# Patient Record
Sex: FEMALE | Race: BLACK OR AFRICAN AMERICAN | NOT HISPANIC OR LATINO | Employment: OTHER | URBAN - METROPOLITAN AREA
[De-identification: names, ages, dates, MRNs, and addresses within clinical notes are randomized per-mention and may not be internally consistent; named-entity substitution may affect disease eponyms.]

---

## 2020-12-07 LAB
HPV16+18+H RISK 12 DNA CVX-IMP: NEGATIVE
PAP RECOMMENDATION EXT: NORMAL
PAP SMEAR: NORMAL

## 2021-07-06 ENCOUNTER — HISTORICAL (OUTPATIENT)
Dept: ADMINISTRATIVE | Facility: HOSPITAL | Age: 41
End: 2021-07-06

## 2021-07-06 LAB — SARS-COV-2 RNA RESP QL NAA+PROBE: NEGATIVE

## 2022-04-11 ENCOUNTER — HISTORICAL (OUTPATIENT)
Dept: ADMINISTRATIVE | Facility: HOSPITAL | Age: 42
End: 2022-04-11

## 2022-04-26 VITALS
OXYGEN SATURATION: 97 % | BODY MASS INDEX: 36.92 KG/M2 | HEIGHT: 62 IN | WEIGHT: 200.63 LBS | DIASTOLIC BLOOD PRESSURE: 88 MMHG | SYSTOLIC BLOOD PRESSURE: 120 MMHG

## 2022-10-20 ENCOUNTER — HOSPITAL ENCOUNTER (EMERGENCY)
Facility: HOSPITAL | Age: 42
Discharge: HOME | End: 2022-10-20
Attending: STUDENT IN AN ORGANIZED HEALTH CARE EDUCATION/TRAINING PROGRAM

## 2022-10-20 VITALS
RESPIRATION RATE: 18 BRPM | TEMPERATURE: 98 F | OXYGEN SATURATION: 100 % | HEIGHT: 62 IN | SYSTOLIC BLOOD PRESSURE: 151 MMHG | WEIGHT: 191 LBS | HEART RATE: 81 BPM | BODY MASS INDEX: 35.15 KG/M2 | DIASTOLIC BLOOD PRESSURE: 97 MMHG

## 2022-10-20 DIAGNOSIS — B37.9 YEAST INFECTION: ICD-10-CM

## 2022-10-20 DIAGNOSIS — R10.9 ABDOMINAL PAIN, UNSPECIFIED ABDOMINAL LOCATION: ICD-10-CM

## 2022-10-20 DIAGNOSIS — N39.0 URINARY TRACT INFECTION WITHOUT HEMATURIA, SITE UNSPECIFIED: Primary | ICD-10-CM

## 2022-10-20 DIAGNOSIS — A59.9 TRICHOMONIASIS: ICD-10-CM

## 2022-10-20 DIAGNOSIS — R30.0 DYSURIA: ICD-10-CM

## 2022-10-20 LAB
ALBUMIN SERPL-MCNC: 4 GM/DL (ref 3.5–5)
ALBUMIN/GLOB SERPL: 1.2 RATIO (ref 1.1–2)
ALP SERPL-CCNC: 85 UNIT/L (ref 40–150)
ALT SERPL-CCNC: 14 UNIT/L (ref 0–55)
APPEARANCE UR: ABNORMAL
AST SERPL-CCNC: 18 UNIT/L (ref 5–34)
BACTERIA #/AREA URNS AUTO: ABNORMAL /HPF
BASOPHILS # BLD AUTO: 0.03 X10(3)/MCL (ref 0–0.2)
BASOPHILS NFR BLD AUTO: 0.5 %
BILIRUB UR QL STRIP.AUTO: NEGATIVE MG/DL
BILIRUBIN DIRECT+TOT PNL SERPL-MCNC: 0.4 MG/DL
BUN SERPL-MCNC: 6.4 MG/DL (ref 7–18.7)
C TRACH DNA SPEC QL NAA+PROBE: NOT DETECTED
CALCIUM SERPL-MCNC: 9.2 MG/DL (ref 8.4–10.2)
CHLORIDE SERPL-SCNC: 105 MMOL/L (ref 98–107)
CO2 SERPL-SCNC: 22 MMOL/L (ref 22–29)
COLOR UR AUTO: YELLOW
CREAT SERPL-MCNC: 0.85 MG/DL (ref 0.55–1.02)
EOSINOPHIL # BLD AUTO: 0.13 X10(3)/MCL (ref 0–0.9)
EOSINOPHIL NFR BLD AUTO: 2 %
ERYTHROCYTE [DISTWIDTH] IN BLOOD BY AUTOMATED COUNT: 14 % (ref 11.5–17)
GFR SERPLBLD CREATININE-BSD FMLA CKD-EPI: >60 MLS/MIN/1.73/M2
GLOBULIN SER-MCNC: 3.4 GM/DL (ref 2.4–3.5)
GLUCOSE SERPL-MCNC: 121 MG/DL (ref 74–100)
GLUCOSE UR QL STRIP.AUTO: NEGATIVE MG/DL
HCT VFR BLD AUTO: 35.8 % (ref 37–47)
HGB BLD-MCNC: 11.5 GM/DL (ref 12–16)
IMM GRANULOCYTES # BLD AUTO: 0.02 X10(3)/MCL (ref 0–0.04)
IMM GRANULOCYTES NFR BLD AUTO: 0.3 %
KETONES UR QL STRIP.AUTO: NEGATIVE MG/DL
LEUKOCYTE ESTERASE UR QL STRIP.AUTO: ABNORMAL UNIT/L
LYMPHOCYTES # BLD AUTO: 1.88 X10(3)/MCL (ref 0.6–4.6)
LYMPHOCYTES NFR BLD AUTO: 28.7 %
MCH RBC QN AUTO: 29.9 PG (ref 27–31)
MCHC RBC AUTO-ENTMCNC: 32.1 MG/DL (ref 33–36)
MCV RBC AUTO: 93.2 FL (ref 80–94)
MONOCYTES # BLD AUTO: 0.28 X10(3)/MCL (ref 0.1–1.3)
MONOCYTES NFR BLD AUTO: 4.3 %
MUCOUS THREADS URNS QL MICRO: ABNORMAL /LPF
N GONORRHOEA DNA SPEC QL NAA+PROBE: NOT DETECTED
NEUTROPHILS # BLD AUTO: 4.2 X10(3)/MCL (ref 2.1–9.2)
NEUTROPHILS NFR BLD AUTO: 64.2 %
NITRITE UR QL STRIP.AUTO: NEGATIVE
NRBC BLD AUTO-RTO: 0 %
PH UR STRIP.AUTO: 5.5 [PH]
PLATELET # BLD AUTO: 410 X10(3)/MCL (ref 130–400)
PMV BLD AUTO: 8.9 FL (ref 7.4–10.4)
POTASSIUM SERPL-SCNC: 4 MMOL/L (ref 3.5–5.1)
PROT SERPL-MCNC: 7.4 GM/DL (ref 6.4–8.3)
PROT UR QL STRIP.AUTO: NEGATIVE MG/DL
RBC # BLD AUTO: 3.84 X10(6)/MCL (ref 4.2–5.4)
RBC #/AREA URNS AUTO: ABNORMAL /HPF
RBC UR QL AUTO: ABNORMAL UNIT/L
SODIUM SERPL-SCNC: 136 MMOL/L (ref 136–145)
SP GR UR STRIP.AUTO: 1.02 (ref 1–1.03)
SQUAMOUS #/AREA URNS AUTO: <5 /HPF
T VAGINALIS URNS QL MICRO: ABNORMAL /HPF
UROBILINOGEN UR STRIP-ACNC: 0.2 MG/DL
WBC # SPEC AUTO: 6.6 X10(3)/MCL (ref 4.5–11.5)
WBC #/AREA URNS AUTO: 113 /HPF
YEAST URNS QL MICRO: ABNORMAL /HPF

## 2022-10-20 PROCEDURE — 96372 THER/PROPH/DIAG INJ SC/IM: CPT | Performed by: STUDENT IN AN ORGANIZED HEALTH CARE EDUCATION/TRAINING PROGRAM

## 2022-10-20 PROCEDURE — 81001 URINALYSIS AUTO W/SCOPE: CPT | Performed by: STUDENT IN AN ORGANIZED HEALTH CARE EDUCATION/TRAINING PROGRAM

## 2022-10-20 PROCEDURE — 99284 EMERGENCY DEPT VISIT MOD MDM: CPT

## 2022-10-20 PROCEDURE — 87591 N.GONORRHOEAE DNA AMP PROB: CPT | Performed by: STUDENT IN AN ORGANIZED HEALTH CARE EDUCATION/TRAINING PROGRAM

## 2022-10-20 PROCEDURE — 63600175 PHARM REV CODE 636 W HCPCS: Performed by: STUDENT IN AN ORGANIZED HEALTH CARE EDUCATION/TRAINING PROGRAM

## 2022-10-20 PROCEDURE — 36415 COLL VENOUS BLD VENIPUNCTURE: CPT | Performed by: STUDENT IN AN ORGANIZED HEALTH CARE EDUCATION/TRAINING PROGRAM

## 2022-10-20 PROCEDURE — 80053 COMPREHEN METABOLIC PANEL: CPT | Performed by: STUDENT IN AN ORGANIZED HEALTH CARE EDUCATION/TRAINING PROGRAM

## 2022-10-20 PROCEDURE — 87491 CHLMYD TRACH DNA AMP PROBE: CPT | Performed by: STUDENT IN AN ORGANIZED HEALTH CARE EDUCATION/TRAINING PROGRAM

## 2022-10-20 PROCEDURE — 87088 URINE BACTERIA CULTURE: CPT | Performed by: STUDENT IN AN ORGANIZED HEALTH CARE EDUCATION/TRAINING PROGRAM

## 2022-10-20 PROCEDURE — 85025 COMPLETE CBC W/AUTO DIFF WBC: CPT | Performed by: STUDENT IN AN ORGANIZED HEALTH CARE EDUCATION/TRAINING PROGRAM

## 2022-10-20 RX ORDER — CEFDINIR 300 MG/1
300 CAPSULE ORAL 2 TIMES DAILY
Qty: 14 CAPSULE | Refills: 0 | Status: SHIPPED | OUTPATIENT
Start: 2022-10-20 | End: 2022-10-27

## 2022-10-20 RX ORDER — WATER FOR INJECTION,STERILE
VIAL (ML) INJECTION
Status: DISCONTINUED
Start: 2022-10-20 | End: 2022-10-20 | Stop reason: HOSPADM

## 2022-10-20 RX ORDER — METRONIDAZOLE 500 MG/1
500 TABLET ORAL EVERY 12 HOURS
Qty: 14 TABLET | Refills: 0 | Status: SHIPPED | OUTPATIENT
Start: 2022-10-20 | End: 2022-10-27

## 2022-10-20 RX ORDER — FLUCONAZOLE 150 MG/1
150 TABLET ORAL DAILY
Qty: 1 TABLET | Refills: 0 | Status: SHIPPED | OUTPATIENT
Start: 2022-10-20 | End: 2022-10-21

## 2022-10-20 RX ORDER — CEFTRIAXONE 1 G/1
0.5 INJECTION, POWDER, FOR SOLUTION INTRAMUSCULAR; INTRAVENOUS
Status: COMPLETED | OUTPATIENT
Start: 2022-10-20 | End: 2022-10-20

## 2022-10-20 RX ADMIN — CEFTRIAXONE SODIUM 0.5 G: 1 INJECTION, POWDER, FOR SOLUTION INTRAMUSCULAR; INTRAVENOUS at 10:10

## 2022-10-20 NOTE — ED PROVIDER NOTES
Encounter Date: 10/20/2022    SCRIBE #1 NOTE: I, Renata Segundo, am scribing for, and in the presence of,  Sanchez Peters IV, MD. I have scribed the following portions of the note - Other sections scribed: HPI, ROS, PE.     History     Chief Complaint   Patient presents with    Abdominal Pain     Pt complains of lower abd and left flank pain. Onset x this morning. Pt states that she is was being treated for UTI but stopped medications due to yeast infection. States that she has dyuria. Denies n/v.      43 y/o female presents to the ED with complaints of L flank pain that radiates to the front. Pt reports that she has been having bladder spasms and intermittent dysuria, and now cloudy urine. She notes hat she was put on Bactrim but stopped taking it because she has a yeast infection now. She also complains of nausea and denies vomiting, fevers, and chills. She notes that she has had UTIs before but this is a lot worse.     The history is provided by the patient. No  was used.   Abdominal Pain  The current episode started several days ago. The onset of the illness was gradual. The problem has been gradually worsening. The abdominal pain is located in the left flank. The abdominal pain radiates to the LUQ. The abdominal pain is relieved by nothing. The other symptoms of the illness include nausea and dysuria. The other symptoms of the illness do not include fever, shortness of breath, vomiting, vaginal discharge or vaginal bleeding.   The dysuria is not associated with hematuria.   Symptoms associated with the illness do not include chills or hematuria.   Review of patient's allergies indicates:  No Known Allergies  History reviewed. No pertinent past medical history.  History reviewed. No pertinent surgical history.  History reviewed. No pertinent family history.  Social History     Tobacco Use    Smoking status: Never    Smokeless tobacco: Never     Review of Systems   Constitutional:   Negative for chills and fever.   HENT:  Negative for congestion, rhinorrhea and sore throat.    Eyes:  Negative for visual disturbance.   Respiratory:  Negative for cough and shortness of breath.    Cardiovascular:  Negative for chest pain and leg swelling.   Gastrointestinal:  Positive for abdominal pain and nausea. Negative for vomiting.   Genitourinary:  Positive for dysuria. Negative for hematuria, vaginal bleeding and vaginal discharge.   Musculoskeletal:  Negative for joint swelling.   Skin:  Negative for rash.   Neurological:  Negative for weakness.   Psychiatric/Behavioral:  Negative for confusion.      Physical Exam     Initial Vitals [10/20/22 0754]   BP Pulse Resp Temp SpO2   (!) 168/107 108 18 97.5 °F (36.4 °C) 100 %      MAP       --         Physical Exam    Nursing note and vitals reviewed.  Constitutional: She is not diaphoretic. No distress.   HENT:   Head: Normocephalic and atraumatic.   Eyes: EOM are normal. Pupils are equal, round, and reactive to light.   Neck: Neck supple.   Normal range of motion.  Cardiovascular:  Normal rate and regular rhythm.           No murmur heard.  Pulmonary/Chest: Breath sounds normal. No respiratory distress. She has no wheezes. She has no rales.   Abdominal: Abdomen is soft. She exhibits no distension. There is no abdominal tenderness.   Musculoskeletal:         General: Normal range of motion.      Cervical back: Normal range of motion and neck supple.     Neurological: She is alert and oriented to person, place, and time. She has normal strength.   Skin: Skin is warm. No rash noted.   Psychiatric: She has a normal mood and affect.       ED Course   Procedures  Labs Reviewed   COMPREHENSIVE METABOLIC PANEL - Abnormal; Notable for the following components:       Result Value    Glucose Level 121 (*)     Blood Urea Nitrogen 6.4 (*)     All other components within normal limits   URINALYSIS, REFLEX TO URINE CULTURE - Abnormal; Notable for the following components:     Appearance, UA Cloudy (*)     Blood, UA 3+ (*)     Leukocyte Esterase, UA 3+ (*)     All other components within normal limits   CBC WITH DIFFERENTIAL - Abnormal; Notable for the following components:    RBC 3.84 (*)     Hgb 11.5 (*)     Hct 35.8 (*)     MCHC 32.1 (*)     Platelet 410 (*)     All other components within normal limits   URINALYSIS, MICROSCOPIC - Abnormal; Notable for the following components:    RBC, UA 21-50 (*)     WBC,  (*)     Bacteria, UA 1+ (*)     Mucous, UA Small (*)     Yeast, UA Rare (*)     Trichomonas, UA Rare (*)     All other components within normal limits   CHLAMYDIA/GONORRHOEAE(GC), PCR - Normal    Narrative:     The Xpert CT/NG test, performed on the Axonifypert system is a qualitative in vitro real-time polymerase chain reaction (PCR) test for the automated detected and differentiation for genomic DNA from Chlamydia trachomatis (CT) and/or Neisseria gonorrhoeae (NG).   CULTURE, URINE   CBC W/ AUTO DIFFERENTIAL    Narrative:     The following orders were created for panel order CBC auto differential.  Procedure                               Abnormality         Status                     ---------                               -----------         ------                     CBC with Differential[[id number]]        Abnormal            Final result                 Please view results for these tests on the individual orders.          Imaging Results    None          Medications   cefTRIAXone injection 0.5 g (0.5 g Intramuscular Given 10/20/22 1010)     Medical Decision Making:   History:   Old Medical Records: I decided to obtain old medical records.  Differential Diagnosis:   Uti, sti, pyelo, candidiasis   Clinical Tests:   Lab Tests: Ordered and Reviewed  ED Management:  IM rocephin           Attending Attestation:           Physician Attestation for Scribe:  Physician Attestation Statement for Scribe #1: I, Sanchez Peters IV, MD, reviewed documentation, as scribed by Renata  Sanya in my presence, and it is both accurate and complete.           ED Course as of 10/20/22 1853   Thu Oct 20, 2022   0937 UA with yeast, trich, and UTI. Patient reports possible STI exposure. Will add on GC/chlamydia. Offered pelvic exam, she would prefer empiric treatment. Will give IM rocephin now and call in doxy if GC/chlamydia positive.  [AC]      ED Course User Index  [AC] Sanchez Peters IV, MD                 Clinical Impression:   Final diagnoses:  [N39.0] Urinary tract infection without hematuria, site unspecified (Primary)  [B37.9] Yeast infection  [A59.9] Trichomoniasis  [R10.9] Abdominal pain, unspecified abdominal location  [R30.0] Dysuria      ED Disposition Condition    Discharge Stable          ED Prescriptions       Medication Sig Dispense Start Date End Date Auth. Provider    fluconazole (DIFLUCAN) 150 MG Tab Take 1 tablet (150 mg total) by mouth once daily. for 1 day 1 tablet 10/20/2022 10/21/2022 Sanchez Petres IV, MD    cefdinir (OMNICEF) 300 MG capsule Take 1 capsule (300 mg total) by mouth 2 (two) times daily. for 7 days 14 capsule 10/20/2022 10/27/2022 Sanchez Peters IV, MD    metroNIDAZOLE (FLAGYL) 500 MG tablet Take 1 tablet (500 mg total) by mouth every 12 (twelve) hours. for 7 days 14 tablet 10/20/2022 10/27/2022 Sanchez Peters IV, MD          Follow-up Information       Follow up With Specialties Details Why Contact Info    [facility] - Emergency Dept Emergency Medicine Go to  If symptoms worsen [address] [facility] [id number] [phone]    PCP  Schedule an appointment as soon as possible for a visit           I, Sanchez Peters MD personally performed the history, PE, MDM, and procedures as documented above and agree with the scribe's documentation.        Sanchez Peters IV, MD  10/20/22 7371

## 2022-10-20 NOTE — DISCHARGE INSTRUCTIONS
Do not drink alcohol while taking antibiotics     Follow up with your primary care physician in 2-3 days.      Return to the emergency department if any worsening symptoms, fever, chest pain, difficulty breathing, or any other new symptoms or concerns.      Please signup for MyChart as noted below in your paperwork to review all labwork, imaging results, and any other incidental findings from today's visit.       Advice after your visit:  Your visit in the emergency department is NOT definitive care - please follow-up with your primary care doctor and/or specialist within 1-2 days.  Please return if you have any worsening in your condition or if you have any other concerns.    If you had radiology exams like an XRAY or CT in the emergency Department the interpreation on them may be preliminary - there may be less time sensitive findings on the reports please obtain these reports within 24 hours from the hospital or by using your out on your mobile phone to access records.  Bring these to your primary care doctor and/or specialist for further review of incidental findings.    Please review any LAB WORK from your visit today with your primary care physician.    If you were prescribed OPIATE PAIN MEDICATION - please understand of these medications can be addictive, you may fill less of the prescription was written for, you do not have to take the full prescription.  You may discard what you do not use.  Please seek help if you feel you are having problems with addiction.  Do not drive or operate heavy machinery if you are taking sedating medications.  Do not mix these medications with alcohol.      If you had a SPLINT placed in the emergency department if you have severe pain numbness tingling or discoloration of year digits please remove the splint and return to the emergency department for further evaluation as this may represent a sign of compromise to the nerves or blood vessels due to swelling.    If you had  SUTURES in the emergency department please have them removed in the prescribed time frame typically within 7-14 days.  You may shower but please do not bathe or swim.  Keep the wounds clean and dry and covered with a clean dressing.  Please return if he have any signs of infection like redness or drainage or pain at the suture site.    Please take the full course of  any ANTIBIOTICS you were prescribed - incomplete courses of antibiotics can cause resistance to antibiotics in the future which will make it difficult to treat any infections you may have.

## 2022-10-22 LAB — BACTERIA UR CULT: NORMAL

## 2023-01-25 ENCOUNTER — HOSPITAL ENCOUNTER (EMERGENCY)
Facility: HOSPITAL | Age: 43
Discharge: LEFT BEFORE DISCHARGE | End: 2023-01-26
Payer: COMMERCIAL

## 2023-01-25 VITALS
OXYGEN SATURATION: 98 % | WEIGHT: 191 LBS | DIASTOLIC BLOOD PRESSURE: 92 MMHG | SYSTOLIC BLOOD PRESSURE: 151 MMHG | RESPIRATION RATE: 16 BRPM | HEIGHT: 62 IN | BODY MASS INDEX: 35.15 KG/M2 | HEART RATE: 85 BPM | TEMPERATURE: 98 F

## 2023-01-25 DIAGNOSIS — R52 PAIN: ICD-10-CM

## 2023-01-25 PROCEDURE — 99283 EMERGENCY DEPT VISIT LOW MDM: CPT

## 2023-01-26 ENCOUNTER — HOSPITAL ENCOUNTER (EMERGENCY)
Facility: HOSPITAL | Age: 43
Discharge: HOME | End: 2023-01-26
Attending: EMERGENCY MEDICINE
Payer: COMMERCIAL

## 2023-01-26 VITALS
DIASTOLIC BLOOD PRESSURE: 92 MMHG | RESPIRATION RATE: 18 BRPM | TEMPERATURE: 98 F | OXYGEN SATURATION: 99 % | SYSTOLIC BLOOD PRESSURE: 132 MMHG | HEIGHT: 62 IN | WEIGHT: 190 LBS | HEART RATE: 84 BPM | BODY MASS INDEX: 34.96 KG/M2

## 2023-01-26 DIAGNOSIS — M70.11 BURSITIS OF RIGHT WRIST: Primary | ICD-10-CM

## 2023-01-26 PROCEDURE — 99284 EMERGENCY DEPT VISIT MOD MDM: CPT

## 2023-01-26 RX ORDER — HYDROCODONE BITARTRATE AND ACETAMINOPHEN 5; 325 MG/1; MG/1
1 TABLET ORAL EVERY 6 HOURS PRN
Qty: 20 TABLET | Refills: 0 | Status: SHIPPED | OUTPATIENT
Start: 2023-01-26 | End: 2023-01-31

## 2023-01-26 RX ORDER — IBUPROFEN 800 MG/1
800 TABLET ORAL 3 TIMES DAILY
Qty: 30 TABLET | Refills: 0 | Status: SHIPPED | OUTPATIENT
Start: 2023-01-26 | End: 2023-02-05

## 2023-01-26 NOTE — ED PROVIDER NOTES
Encounter Date: 1/26/2023       History     Chief Complaint   Patient presents with    Wrist Pain     Pt c/o RT wrist pain since Monday; came to ED last night but left before she was seen. Denies injury.      The history is provided by the patient. No  was used.   Wrist Pain  This is a new problem. The current episode started more than 2 days ago. The problem occurs constantly. The problem has been gradually worsening. Pertinent negatives include no chest pain and no shortness of breath. The symptoms are aggravated by bending (lifting). Nothing relieves the symptoms.   Denies injury.  States she woke with pain 1/23 AM and it has progressively worsened and localized to the scaphoid area.    Review of patient's allergies indicates:  No Known Allergies  No past medical history on file. none  No past surgical history on file. noncontributory  No family history on file.  Social History     Tobacco Use    Smoking status: Never    Smokeless tobacco: Never     Review of Systems   Constitutional:  Negative for fever.   HENT:  Negative for sore throat.    Respiratory:  Negative for shortness of breath.    Cardiovascular:  Negative for chest pain.   Gastrointestinal:  Negative for nausea.   Genitourinary:  Negative for dysuria.   Musculoskeletal:  Negative for back pain.   Skin:  Negative for rash.   Neurological:  Negative for weakness.   Hematological:  Does not bruise/bleed easily.     Physical Exam     Initial Vitals [01/26/23 0823]   BP Pulse Resp Temp SpO2   (!) 132/92 84 18 98.2 °F (36.8 °C) 99 %      MAP       --         Physical Exam    Nursing note and vitals reviewed.  Constitutional: She appears well-developed and well-nourished.   HENT:   Head: Normocephalic and atraumatic.   Right Ear: External ear normal.   Left Ear: External ear normal.   Eyes: Conjunctivae and EOM are normal. Pupils are equal, round, and reactive to light.   Neck: Neck supple.   Normal range of motion.  Cardiovascular:   Normal rate, regular rhythm, normal heart sounds and intact distal pulses.           Pulmonary/Chest: Breath sounds normal.   Abdominal: Abdomen is soft. Bowel sounds are normal.   Musculoskeletal:         General: Normal range of motion.        Hands:       Cervical back: Normal range of motion and neck supple.     Neurological: She is alert and oriented to person, place, and time. GCS score is 15. GCS eye subscore is 4. GCS verbal subscore is 5. GCS motor subscore is 6.   Skin: Skin is warm and dry. Capillary refill takes less than 2 seconds.   Psychiatric: She has a normal mood and affect. Her behavior is normal. Judgment and thought content normal.       ED Course   Procedures  Labs Reviewed - No data to display       Imaging Results    None          Medications - No data to display               Pt with atraumatic wrist pain/swelling.  Differential includes: bursitis, ganglion cyst, tendinitis, DeQuervain's tenosynovitis, fracture.    Xrays obtained at prior visit (early this AM - pt LWBS) - my independent interpretation of those films is that they are normal.  Swollen area is definitely fluid-filled, but seem rather deep for a ganglion cyst.  Bursitis suspected.  Will immobilize with thumb spica and D/C on NSAID's/analgesics.  Will refer to Dr. Pham (hand surgery).            Clinical Impression:   Final diagnoses:  [M70.11] Bursitis of right wrist (Primary)        ED Disposition Condition    Discharge Stable          ED Prescriptions       Medication Sig Dispense Start Date End Date Auth. Provider    ibuprofen (ADVIL,MOTRIN) 800 MG tablet Take 1 tablet (800 mg total) by mouth 3 (three) times daily. for 10 days 30 tablet 1/26/2023 2/5/2023 Kj Hernandez MD    HYDROcodone-acetaminophen (NORCO) 5-325 mg per tablet Take 1 tablet by mouth every 6 (six) hours as needed for Pain. 20 tablet 1/26/2023 1/31/2023 Kj Hernandez MD          Follow-up Information       Follow up With Specialties Details  Why Contact Info    Mike Pham MD Hand Surgery Schedule an appointment as soon as possible for a visit in 1 week  [address] [facility] [address] [place] [id number]  [phone]               Kj Hernandez MD  01/26/23 0992

## 2023-03-10 LAB
ALBUMIN SERPL-MCNC: 4.2 G/DL (ref 3.8–4.8)
ALBUMIN/GLOB SERPL: 1.5 {RATIO} (ref 1.2–2.2)
ALP SERPL-CCNC: 91 IU/L (ref 44–121)
ALT SERPL-CCNC: 13 IU/L (ref 0–32)
APPEARANCE UR: CLEAR
AST SERPL-CCNC: 17 IU/L (ref 0–40)
BACTERIA #/AREA URNS HPF: ABNORMAL /[HPF]
BACTERIA UR CULT: NORMAL
BACTERIA UR CULT: NORMAL
BASOPHILS # BLD AUTO: 0 X10E3/UL (ref 0–0.2)
BASOPHILS NFR BLD AUTO: 1 %
BILIRUB SERPL-MCNC: 0.3 MG/DL (ref 0–1.2)
BILIRUB UR QL STRIP: NEGATIVE
BUN SERPL-MCNC: 9 MG/DL (ref 6–24)
BUN/CREAT SERPL: 11 (ref 9–23)
CALCIUM SERPL-MCNC: 9.2 MG/DL (ref 8.7–10.2)
CHLORIDE SERPL-SCNC: 101 MMOL/L (ref 96–106)
CHOLEST SERPL-MCNC: 196 MG/DL (ref 100–199)
CO2 SERPL-SCNC: 23 MMOL/L (ref 20–29)
COLOR UR: YELLOW
CREAT SERPL-MCNC: 0.79 MG/DL (ref 0.57–1)
CRYSTALS URNS MICRO: ABNORMAL
EOSINOPHIL # BLD AUTO: 0.2 X10E3/UL (ref 0–0.4)
EOSINOPHIL NFR BLD AUTO: 3 %
EPI CELLS #/AREA URNS HPF: ABNORMAL /HPF (ref 0–10)
ERYTHROCYTE [DISTWIDTH] IN BLOOD BY AUTOMATED COUNT: 13.7 % (ref 11.7–15.4)
EST. GFR  (NO RACE VARIABLE): 96 ML/MIN/1.73
GLOBULIN SER CALC-MCNC: 2.8 G/DL (ref 1.5–4.5)
GLUCOSE SERPL-MCNC: 97 MG/DL (ref 70–99)
GLUCOSE UR QL STRIP: NEGATIVE
HBA1C MFR BLD: 5.7 % (ref 4.8–5.6)
HCT VFR BLD AUTO: 33.1 % (ref 34–46.6)
HDLC SERPL-MCNC: 59 MG/DL
HGB BLD-MCNC: 10.9 G/DL (ref 11.1–15.9)
HGB UR QL STRIP: ABNORMAL
IMM GRANULOCYTES NFR BLD AUTO: 0 %
KETONES UR QL STRIP: NEGATIVE
LDLC SERPL CALC-MCNC: 125 MG/DL (ref 0–99)
LEUKOCYTE ESTERASE UR QL STRIP: ABNORMAL
LYMPHOCYTES # BLD AUTO: 2 X10E3/UL (ref 0.7–3.1)
LYMPHOCYTES NFR BLD AUTO: 35 %
MCH RBC QN AUTO: 30.1 PG (ref 26.6–33)
MCHC RBC AUTO-ENTMCNC: 32.9 G/DL (ref 31.5–35.7)
MCV RBC AUTO: 91 FL (ref 79–97)
MICRO URNS: ABNORMAL
MONOCYTES # BLD AUTO: 0.3 X10E3/UL (ref 0.1–0.9)
MONOCYTES NFR BLD AUTO: 5 %
NEUTROPHILS # BLD AUTO: 3.2 X10E3/UL (ref 1.4–7)
NEUTROPHILS NFR BLD AUTO: 56 %
NITRITE UR QL STRIP: NEGATIVE
PH UR STRIP: 7.5 [PH] (ref 5–7.5)
PLATELET # BLD AUTO: 361 X10E3/UL (ref 150–450)
POTASSIUM SERPL-SCNC: 4 MMOL/L (ref 3.5–5.2)
PROT SERPL-MCNC: 7 G/DL (ref 6–8.5)
PROT UR QL STRIP: NEGATIVE
RBC # BLD AUTO: 3.62 X10E6/UL (ref 3.77–5.28)
RBC #/AREA URNS HPF: ABNORMAL /HPF (ref 0–2)
SODIUM SERPL-SCNC: 137 MMOL/L (ref 134–144)
SP GR UR STRIP: 1.01 (ref 1–1.03)
SPECIMEN STATUS REPORT: NORMAL
T4 FREE SERPL-MCNC: 0.98 NG/DL (ref 0.82–1.77)
TRIGL SERPL-MCNC: 67 MG/DL (ref 0–149)
TSH SERPL DL<=0.005 MIU/L-ACNC: 1.58 UIU/ML (ref 0.45–4.5)
URINALYSIS REFLEX: ABNORMAL
UROBILINOGEN UR STRIP-MCNC: 0.2 MG/DL (ref 0.2–1)
VLDLC SERPL CALC-MCNC: 12 MG/DL (ref 5–40)
WBC # BLD AUTO: 5.7 X10E3/UL (ref 3.4–10.8)
WBC #/AREA URNS HPF: ABNORMAL /HPF (ref 0–5)

## 2023-03-13 ENCOUNTER — DOCUMENTATION ONLY (OUTPATIENT)
Dept: ADMINISTRATIVE | Facility: HOSPITAL | Age: 43
End: 2023-03-13

## 2023-03-21 ENCOUNTER — OFFICE VISIT (OUTPATIENT)
Dept: FAMILY MEDICINE | Facility: CLINIC | Age: 43
End: 2023-03-21

## 2023-03-21 VITALS
WEIGHT: 196.63 LBS | BODY MASS INDEX: 34.84 KG/M2 | DIASTOLIC BLOOD PRESSURE: 86 MMHG | SYSTOLIC BLOOD PRESSURE: 120 MMHG | HEIGHT: 63 IN | RESPIRATION RATE: 20 BRPM | TEMPERATURE: 98 F | OXYGEN SATURATION: 100 % | HEART RATE: 103 BPM

## 2023-03-21 DIAGNOSIS — E78.2 MIXED HYPERLIPIDEMIA: ICD-10-CM

## 2023-03-21 DIAGNOSIS — D50.0 IRON DEFICIENCY ANEMIA DUE TO CHRONIC BLOOD LOSS: ICD-10-CM

## 2023-03-21 DIAGNOSIS — I10 PRIMARY HYPERTENSION: ICD-10-CM

## 2023-03-21 DIAGNOSIS — F41.9 ANXIETY AND DEPRESSION: ICD-10-CM

## 2023-03-21 DIAGNOSIS — J30.2 SEASONAL ALLERGIES: ICD-10-CM

## 2023-03-21 DIAGNOSIS — Z13.6 ENCOUNTER FOR SCREENING FOR CARDIOVASCULAR DISORDERS: ICD-10-CM

## 2023-03-21 DIAGNOSIS — Z00.00 WELLNESS EXAMINATION: Primary | ICD-10-CM

## 2023-03-21 DIAGNOSIS — F32.A ANXIETY AND DEPRESSION: ICD-10-CM

## 2023-03-21 PROCEDURE — 1159F PR MEDICATION LIST DOCUMENTED IN MEDICAL RECORD: ICD-10-PCS | Mod: CPTII,,, | Performed by: FAMILY MEDICINE

## 2023-03-21 PROCEDURE — 1160F PR REVIEW ALL MEDS BY PRESCRIBER/CLIN PHARMACIST DOCUMENTED: ICD-10-PCS | Mod: CPTII,,, | Performed by: FAMILY MEDICINE

## 2023-03-21 PROCEDURE — 99396 PREV VISIT EST AGE 40-64: CPT | Mod: ,,, | Performed by: FAMILY MEDICINE

## 2023-03-21 PROCEDURE — 3079F PR MOST RECENT DIASTOLIC BLOOD PRESSURE 80-89 MM HG: ICD-10-PCS | Mod: CPTII,,, | Performed by: FAMILY MEDICINE

## 2023-03-21 PROCEDURE — 3079F DIAST BP 80-89 MM HG: CPT | Mod: CPTII,,, | Performed by: FAMILY MEDICINE

## 2023-03-21 PROCEDURE — 1159F MED LIST DOCD IN RCRD: CPT | Mod: CPTII,,, | Performed by: FAMILY MEDICINE

## 2023-03-21 PROCEDURE — 1160F RVW MEDS BY RX/DR IN RCRD: CPT | Mod: CPTII,,, | Performed by: FAMILY MEDICINE

## 2023-03-21 PROCEDURE — 3074F SYST BP LT 130 MM HG: CPT | Mod: CPTII,,, | Performed by: FAMILY MEDICINE

## 2023-03-21 PROCEDURE — 3044F PR MOST RECENT HEMOGLOBIN A1C LEVEL <7.0%: ICD-10-PCS | Mod: CPTII,,, | Performed by: FAMILY MEDICINE

## 2023-03-21 PROCEDURE — 99396 PR PREVENTIVE VISIT,EST,40-64: ICD-10-PCS | Mod: ,,, | Performed by: FAMILY MEDICINE

## 2023-03-21 PROCEDURE — 3008F BODY MASS INDEX DOCD: CPT | Mod: CPTII,,, | Performed by: FAMILY MEDICINE

## 2023-03-21 PROCEDURE — 3074F PR MOST RECENT SYSTOLIC BLOOD PRESSURE < 130 MM HG: ICD-10-PCS | Mod: CPTII,,, | Performed by: FAMILY MEDICINE

## 2023-03-21 PROCEDURE — 3008F PR BODY MASS INDEX (BMI) DOCUMENTED: ICD-10-PCS | Mod: CPTII,,, | Performed by: FAMILY MEDICINE

## 2023-03-21 PROCEDURE — 3044F HG A1C LEVEL LT 7.0%: CPT | Mod: CPTII,,, | Performed by: FAMILY MEDICINE

## 2023-03-21 RX ORDER — FERROUS SULFATE 325(65) MG
325 TABLET ORAL DAILY PRN
COMMUNITY

## 2023-03-21 NOTE — PROGRESS NOTES
Patient ID: [id number]     Chief Complaint: Annual Exam        HPI:     Liz Magaña is a 42 y.o. female here today for an annual wellness. Reviewed and discussed lab results.   1) Hypertension: Patient has been taking medicine daily. BP is normal at home. No symptoms associated with increased BP such as headache/ visual changes/ dizziness/ chest pain.   2)  Depression/Anxiety: Patient has been doing well on medication. Trying to incorporate lifestyle changes including exercise.  No noticeable side effects of the medication.  3) Seasonal Allergies: Patient is continuing to take medication as needed-symptoms are controlled.  No noticeable side effects of medication.          Past Medical History:   Diagnosis Date    Anxiety and depression 3/21/2023    Essential (primary) hypertension     Mixed hyperlipidemia 3/21/2023    Pre-diabetes     Seasonal allergies 2023    Vitamin D deficiency         Past Surgical History:   Procedure Laterality Date     SECTION  1996     SECTION  2006    EYE SURGERY  2000        Social History     Tobacco Use    Smoking status: Never    Smokeless tobacco: Never   Substance Use Topics    Alcohol use: Not Currently    Drug use: Never        Social History     Socioeconomic History    Marital status: Single    Number of children: 2        Current Outpatient Medications   Medication Instructions    amLODIPine (NORVASC) 5 mg, Oral, Every morning    busPIRone (BUSPAR) 15 MG tablet 1 tablet, Oral, Daily    ferrous sulfate (FEOSOL) 325 mg, Oral, Daily PRN    loratadine (CLARITIN) 10 mg, Oral       Review of patient's allergies indicates:  No Known Allergies     Patient Care Team:  Denita Gonzalez MD as PCP - General (Family Medicine)     Subjective:     Review of Systems    12 point review of systems conducted, negative except as stated in the history of present illness. See HPI for details.      Objective:     Visit Vitals  /86 (BP Location: Left arm,  "Patient Position: Sitting)   Pulse 103   Temp 97.9 °F (36.6 °C) (Oral)   Resp 20   Ht 5' 3" (1.6 m)   Wt 89.2 kg (196 lb 9.6 oz)   LMP 03/02/2023 (Exact Date)   SpO2 100%   BMI 34.83 kg/m²       Physical Exam    General: Alert and oriented, No acute distress.  Head: Normocephalic, Atraumatic.  Eye: Pupils are equal, round and reactive to light, Extraocular movements are intact, Sclera non-icteric.  Ears/Nose/Throat: Normal, Mucosa moist,Clear.  Neck/Thyroid: Supple, Non-tender, No carotid bruit, No palpable thyromegaly or thyroid nodule, No lymphadenopathy, No JVD, Full range of motion.  Respiratory: Clear to auscultation bilaterally; No wheezes, rales or rhonchi,Non-labored respirations, Symmetrical chest wall expansion.  Cardiovascular: Regular rate and rhythm, S1/S2 normal, No murmurs, rubs or gallops.  Gastrointestinal: Soft, Non-tender, Non-distended, Normal bowel sounds, No palpable organomegaly.  Musculoskeletal: Normal range of motion.  Integumentary: Warm, Dry, Intact, No suspicious lesions or rashes.  Extremities: No clubbing, cyanosis or edema  Neurologic: No focal deficits, Cranial Nerves II-XII are grossly intact, Motor strength normal upper and lower extremities, Sensory exam intact.  Psychiatric: Normal interaction, Coherent speech, Euthymic mood, Appropriate affect       Assessment:       ICD-10-CM ICD-9-CM   1. Wellness examination  Z00.00 V70.0   2. Seasonal allergies  J30.2 477.9   3. Primary hypertension  I10 401.9   4. Anxiety and depression  F41.9 300.00    F32.A 311   5. Iron deficiency anemia due to chronic blood loss  D50.0 280.0   6. Mixed hyperlipidemia  E78.2 272.2   7. Encounter for screening for cardiovascular disorders  Z13.6 V81.2        Plan:       Five Rules Reviewed: 1. Water/Nutrition - Real Food, Limit Fast Food 2. Exercise 20-30 minutes most days of the week 3. Mental Health - "Is your work a calling or paycheck" Define 'What is your purpose - what matters to you' 4. Stress - Is " "an Individual Response, Therefore Can be Controlled by the Individual. 5. Meditation / Immunizations / Multivitamin use-Vitamin D3 / Screenings    Cervical Cancer Screening -  Last Pap 3/21/2022    Breast Cancer Screening - Last Mammogram on 4/27/2021    Colon Cancer Screening - To be referred at age 45    Eye Exam - Recommend annually.     Dental Exam - Recommend biannual exams    1. Wellness examination  Wellness: Five Rules Reviewed: Water/Nutrition-Real Food, Limit Fast Food/ Exercise 20-30 minutes most days of the week/ Mental health- "Is your work a calling or paycheck" Define 'What is your purpose-what matters to you' Stress- Is an Individual Response- Therefore Can be Controlled by the Individual. Meditation/ Immunizations/Multivitamin use-Vitamin D3/ Screenings    2. Seasonal allergies  Patient is currently stable.  No acute modifications recommended.  Continue with current treatment.      3. Primary hypertension  Patient has been taking medication. Blood pressure goal of less than 130/80-blood pressure is stable. Continue to encourage diet and activity modifications. Including stress management. Pathophysiology/treatment/dangers discussed.    - Comprehensive Metabolic Panel; Future  - CBC Without Differential; Future    4. Anxiety and depression  Depression/Anxiety: Patient is doing well on medication for depression/anxiety- continue to encourage lifestyle modifications including 20-30 minutes exercise daily      5. Iron deficiency anemia due to chronic blood loss  Pathophysiology/Treatment/ Dangers Discussed-patient is having problems with heavy menstrual cycles-would like referral to gynecologist in order to obtain opinion regarding surgical options..      6. Mixed hyperlipidemia  Lab Results   Component Value Date    CHOL 196 03/08/2023    TRIG 67 03/08/2023    HDL 59 03/08/2023     Pathophysiology/treatment/dangers discussed. Patient to continue diet modification.   Total cholesterol 196 ( Goal less " than 200) HDL 59 ( Goal high as possible)  ( Goal less than 100) Triglycerides 67 ( Goal less than 150)-patient to obtain calcium coronary scan management based on findings.  Risk factors include hypertension/family history of CAD.  - CT Calcium Scoring Cardiac; Future    7. Encounter for screening for cardiovascular disorders  Check studies management based upon results.  Pathophysiology/treatment/dangers discussed.      Vaccinations -   Immunization History   Administered Date(s) Administered    DTP 1980, 02/19/1981, 04/30/1981, 02/18/1982, 01/16/1986    MMR 02/18/1982, 04/28/1998    OPV 1980, 02/19/1981, 04/30/1981, 02/18/1982    Td (ADULT) 04/28/1998    Tdap 08/17/2011      The patient's Health Maintenance was reviewed and the following appears to be due at this time:   Health Maintenance Due   Topic Date Due    Hepatitis C Screening  Never done    COVID-19 Vaccine (1) Never done    HIV Screening  Never done    TETANUS VACCINE  08/17/2021    Mammogram  04/27/2022    Influenza Vaccine (1) Never done         Follow up in about 6 months (around 9/21/2023) for HTN. In addition to their scheduled follow up, the patient has also been instructed to follow up on as needed basis.     Future Appointments   Date Time Provider Department Center   9/26/2023 10:30 AM MD TRISH Mckay   3/26/2024 10:30 AM MD TRISH Mckay MD

## 2023-04-27 RX ORDER — AMLODIPINE BESYLATE 5 MG/1
TABLET ORAL
Qty: 30 TABLET | Refills: 6 | Status: SHIPPED | OUTPATIENT
Start: 2023-04-27 | End: 2024-03-06

## 2023-04-28 RX ORDER — AMLODIPINE BESYLATE 5 MG/1
TABLET ORAL
Qty: 90 TABLET | Refills: 1 | OUTPATIENT
Start: 2023-04-28

## 2023-06-13 LAB — BCS RECOMMENDATION EXT: NORMAL

## 2023-06-19 ENCOUNTER — HOSPITAL ENCOUNTER (EMERGENCY)
Facility: HOSPITAL | Age: 43
Discharge: HOME | End: 2023-06-19
Attending: INTERNAL MEDICINE
Payer: COMMERCIAL

## 2023-06-19 VITALS
RESPIRATION RATE: 18 BRPM | WEIGHT: 200 LBS | TEMPERATURE: 99 F | SYSTOLIC BLOOD PRESSURE: 155 MMHG | HEIGHT: 62 IN | BODY MASS INDEX: 36.8 KG/M2 | DIASTOLIC BLOOD PRESSURE: 109 MMHG | OXYGEN SATURATION: 100 % | HEART RATE: 105 BPM

## 2023-06-19 DIAGNOSIS — K80.20 CALCULUS OF GALLBLADDER WITHOUT CHOLECYSTITIS WITHOUT OBSTRUCTION: Primary | ICD-10-CM

## 2023-06-19 LAB
ALBUMIN SERPL-MCNC: 4.3 G/DL (ref 3.5–5)
ALBUMIN/GLOB SERPL: 1 RATIO (ref 1.1–2)
ALP SERPL-CCNC: 80 UNIT/L (ref 40–150)
ALT SERPL-CCNC: 15 UNIT/L (ref 0–55)
APPEARANCE UR: CLEAR
AST SERPL-CCNC: 18 UNIT/L (ref 5–34)
B-HCG SERPL QL: NEGATIVE
BASOPHILS # BLD AUTO: 0.03 X10(3)/MCL
BASOPHILS NFR BLD AUTO: 0.3 %
BILIRUB UR QL STRIP.AUTO: NEGATIVE MG/DL
BILIRUBIN DIRECT+TOT PNL SERPL-MCNC: 0.8 MG/DL
BUN SERPL-MCNC: 6.4 MG/DL (ref 7–18.7)
CALCIUM SERPL-MCNC: 10.2 MG/DL (ref 8.4–10.2)
CHLORIDE SERPL-SCNC: 104 MMOL/L (ref 98–107)
CO2 SERPL-SCNC: 22 MMOL/L (ref 22–29)
COLOR UR: YELLOW
CREAT SERPL-MCNC: 0.85 MG/DL (ref 0.55–1.02)
EOSINOPHIL # BLD AUTO: 0.07 X10(3)/MCL (ref 0–0.9)
EOSINOPHIL NFR BLD AUTO: 0.8 %
ERYTHROCYTE [DISTWIDTH] IN BLOOD BY AUTOMATED COUNT: 13.5 % (ref 11.5–17)
GFR SERPLBLD CREATININE-BSD FMLA CKD-EPI: >60 MLS/MIN/1.73/M2
GLOBULIN SER-MCNC: 4.2 GM/DL (ref 2.4–3.5)
GLUCOSE SERPL-MCNC: 91 MG/DL (ref 74–100)
GLUCOSE UR QL STRIP.AUTO: NEGATIVE MG/DL
HCT VFR BLD AUTO: 38.2 % (ref 37–47)
HGB BLD-MCNC: 12.9 G/DL (ref 12–16)
IMM GRANULOCYTES # BLD AUTO: 0.02 X10(3)/MCL (ref 0–0.04)
IMM GRANULOCYTES NFR BLD AUTO: 0.2 %
INR BLD: 0.96 (ref 2–3)
KETONES UR QL STRIP.AUTO: ABNORMAL MG/DL
LEUKOCYTE ESTERASE UR QL STRIP.AUTO: NEGATIVE UNIT/L
LIPASE SERPL-CCNC: 20 U/L
LYMPHOCYTES # BLD AUTO: 1.99 X10(3)/MCL (ref 0.6–4.6)
LYMPHOCYTES NFR BLD AUTO: 23 %
MCH RBC QN AUTO: 30.3 PG (ref 27–31)
MCHC RBC AUTO-ENTMCNC: 33.8 G/DL (ref 33–36)
MCV RBC AUTO: 89.7 FL (ref 80–94)
MONOCYTES # BLD AUTO: 0.5 X10(3)/MCL (ref 0.1–1.3)
MONOCYTES NFR BLD AUTO: 5.8 %
NEUTROPHILS # BLD AUTO: 6.05 X10(3)/MCL (ref 2.1–9.2)
NEUTROPHILS NFR BLD AUTO: 69.9 %
NITRITE UR QL STRIP.AUTO: NEGATIVE
NRBC BLD AUTO-RTO: 0 %
PH UR STRIP.AUTO: 6.5 [PH]
PLATELET # BLD AUTO: 399 X10(3)/MCL (ref 130–400)
PMV BLD AUTO: 8.5 FL (ref 7.4–10.4)
POTASSIUM SERPL-SCNC: 3.5 MMOL/L (ref 3.5–5.1)
PROT SERPL-MCNC: 8.5 GM/DL (ref 6.4–8.3)
PROT UR QL STRIP.AUTO: NEGATIVE MG/DL
PROTHROMBIN TIME: 12.9 SECONDS (ref 11.7–14.5)
RBC # BLD AUTO: 4.26 X10(6)/MCL (ref 4.2–5.4)
RBC UR QL AUTO: NEGATIVE UNIT/L
SODIUM SERPL-SCNC: 138 MMOL/L (ref 136–145)
SP GR UR STRIP.AUTO: 1.01
UROBILINOGEN UR STRIP-ACNC: 0.2 MG/DL
WBC # SPEC AUTO: 8.66 X10(3)/MCL (ref 4.5–11.5)

## 2023-06-19 PROCEDURE — 25000003 PHARM REV CODE 250: Performed by: INTERNAL MEDICINE

## 2023-06-19 PROCEDURE — 81025 URINE PREGNANCY TEST: CPT | Performed by: INTERNAL MEDICINE

## 2023-06-19 PROCEDURE — 81003 URINALYSIS AUTO W/O SCOPE: CPT | Performed by: INTERNAL MEDICINE

## 2023-06-19 PROCEDURE — 85610 PROTHROMBIN TIME: CPT | Performed by: INTERNAL MEDICINE

## 2023-06-19 PROCEDURE — 80053 COMPREHEN METABOLIC PANEL: CPT | Performed by: INTERNAL MEDICINE

## 2023-06-19 PROCEDURE — 99284 EMERGENCY DEPT VISIT MOD MDM: CPT | Mod: 25

## 2023-06-19 PROCEDURE — 85025 COMPLETE CBC W/AUTO DIFF WBC: CPT | Performed by: INTERNAL MEDICINE

## 2023-06-19 PROCEDURE — 83690 ASSAY OF LIPASE: CPT | Performed by: INTERNAL MEDICINE

## 2023-06-19 RX ORDER — ONDANSETRON 4 MG/1
4 TABLET, ORALLY DISINTEGRATING ORAL EVERY 6 HOURS PRN
Qty: 15 TABLET | Refills: 0 | Status: SHIPPED | OUTPATIENT
Start: 2023-06-19 | End: 2023-07-07

## 2023-06-19 RX ORDER — HYDROCODONE BITARTRATE AND ACETAMINOPHEN 7.5; 325 MG/1; MG/1
1 TABLET ORAL EVERY 6 HOURS PRN
Qty: 15 TABLET | Refills: 0 | Status: SHIPPED | OUTPATIENT
Start: 2023-06-19 | End: 2023-07-07

## 2023-06-19 RX ORDER — OXYCODONE AND ACETAMINOPHEN 10; 325 MG/1; MG/1
1 TABLET ORAL ONCE
Status: COMPLETED | OUTPATIENT
Start: 2023-06-19 | End: 2023-06-19

## 2023-06-19 RX ADMIN — OXYCODONE HYDROCHLORIDE AND ACETAMINOPHEN 1 TABLET: 10; 325 TABLET ORAL at 09:06

## 2023-06-19 NOTE — ED TRIAGE NOTES
"Pt complaint of upper abd pain "as if it is in knots" for a week and it has affected her ability to eat with pt relating no relief with OTC meds, Rolaids, gas x and Colace. Pt says that she has had 2 BM today  "

## 2023-06-20 ENCOUNTER — ANESTHESIA EVENT (OUTPATIENT)
Dept: SURGERY | Facility: HOSPITAL | Age: 43
End: 2023-06-20
Payer: COMMERCIAL

## 2023-06-20 ENCOUNTER — HOSPITAL ENCOUNTER (OUTPATIENT)
Facility: HOSPITAL | Age: 43
Discharge: HOME | End: 2023-06-21
Attending: STUDENT IN AN ORGANIZED HEALTH CARE EDUCATION/TRAINING PROGRAM | Admitting: SURGERY
Payer: COMMERCIAL

## 2023-06-20 ENCOUNTER — ANESTHESIA (OUTPATIENT)
Dept: SURGERY | Facility: HOSPITAL | Age: 43
End: 2023-06-20
Payer: COMMERCIAL

## 2023-06-20 DIAGNOSIS — R10.10 UPPER ABDOMINAL PAIN: ICD-10-CM

## 2023-06-20 DIAGNOSIS — K81.0 ACUTE CHOLECYSTITIS: Primary | ICD-10-CM

## 2023-06-20 PROBLEM — K80.10 CHOLECYSTITIS WITH CHOLELITHIASIS: Status: ACTIVE | Noted: 2023-06-20

## 2023-06-20 LAB
ALBUMIN SERPL-MCNC: 4.4 G/DL (ref 3.5–5)
ALBUMIN/GLOB SERPL: 1.1 RATIO (ref 1.1–2)
ALP SERPL-CCNC: 90 UNIT/L (ref 40–150)
ALT SERPL-CCNC: 15 UNIT/L (ref 0–55)
AST SERPL-CCNC: 16 UNIT/L (ref 5–34)
B-HCG SERPL QL: NEGATIVE
BASOPHILS # BLD AUTO: 0.04 X10(3)/MCL
BASOPHILS NFR BLD AUTO: 0.3 %
BILIRUBIN DIRECT+TOT PNL SERPL-MCNC: 0.9 MG/DL
BUN SERPL-MCNC: 6.7 MG/DL (ref 7–18.7)
CALCIUM SERPL-MCNC: 9.7 MG/DL (ref 8.4–10.2)
CHLORIDE SERPL-SCNC: 104 MMOL/L (ref 98–107)
CO2 SERPL-SCNC: 22 MMOL/L (ref 22–29)
CREAT SERPL-MCNC: 0.87 MG/DL (ref 0.55–1.02)
EOSINOPHIL # BLD AUTO: 0.01 X10(3)/MCL (ref 0–0.9)
EOSINOPHIL NFR BLD AUTO: 0.1 %
ERYTHROCYTE [DISTWIDTH] IN BLOOD BY AUTOMATED COUNT: 13.4 % (ref 11.5–17)
GFR SERPLBLD CREATININE-BSD FMLA CKD-EPI: >60 MLS/MIN/1.73/M2
GLOBULIN SER-MCNC: 3.9 GM/DL (ref 2.4–3.5)
GLUCOSE SERPL-MCNC: 121 MG/DL (ref 74–100)
HCT VFR BLD AUTO: 39.2 % (ref 37–47)
HGB BLD-MCNC: 13.1 G/DL (ref 12–16)
IMM GRANULOCYTES # BLD AUTO: 0.07 X10(3)/MCL (ref 0–0.04)
IMM GRANULOCYTES NFR BLD AUTO: 0.5 %
LACTATE SERPL-SCNC: 1.6 MMOL/L (ref 0.5–2.2)
LIPASE SERPL-CCNC: 37 U/L
LYMPHOCYTES # BLD AUTO: 1.54 X10(3)/MCL (ref 0.6–4.6)
LYMPHOCYTES NFR BLD AUTO: 11.1 %
MCH RBC QN AUTO: 30.3 PG (ref 27–31)
MCHC RBC AUTO-ENTMCNC: 33.4 G/DL (ref 33–36)
MCV RBC AUTO: 90.5 FL (ref 80–94)
MONOCYTES # BLD AUTO: 0.52 X10(3)/MCL (ref 0.1–1.3)
MONOCYTES NFR BLD AUTO: 3.8 %
NEUTROPHILS # BLD AUTO: 11.65 X10(3)/MCL (ref 2.1–9.2)
NEUTROPHILS NFR BLD AUTO: 84.2 %
NRBC BLD AUTO-RTO: 0 %
PLATELET # BLD AUTO: 446 X10(3)/MCL (ref 130–400)
PMV BLD AUTO: 8.6 FL (ref 7.4–10.4)
POTASSIUM SERPL-SCNC: 3.7 MMOL/L (ref 3.5–5.1)
PROT SERPL-MCNC: 8.3 GM/DL (ref 6.4–8.3)
RBC # BLD AUTO: 4.33 X10(6)/MCL (ref 4.2–5.4)
SODIUM SERPL-SCNC: 136 MMOL/L (ref 136–145)
TROPONIN I SERPL-MCNC: <0.01 NG/ML (ref 0–0.04)
WBC # SPEC AUTO: 13.83 X10(3)/MCL (ref 4.5–11.5)

## 2023-06-20 PROCEDURE — 36000709 HC OR TIME LEV III EA ADD 15 MIN: Performed by: SURGERY

## 2023-06-20 PROCEDURE — D9220A PRA ANESTHESIA: ICD-10-PCS | Mod: ANES,,, | Performed by: ANESTHESIOLOGY

## 2023-06-20 PROCEDURE — 93010 EKG 12-LEAD: ICD-10-PCS | Mod: ,,, | Performed by: INTERNAL MEDICINE

## 2023-06-20 PROCEDURE — 93010 ELECTROCARDIOGRAM REPORT: CPT | Mod: ,,, | Performed by: INTERNAL MEDICINE

## 2023-06-20 PROCEDURE — D9220A PRA ANESTHESIA: Mod: ANES,,, | Performed by: ANESTHESIOLOGY

## 2023-06-20 PROCEDURE — 36000708 HC OR TIME LEV III 1ST 15 MIN: Performed by: SURGERY

## 2023-06-20 PROCEDURE — 25000003 PHARM REV CODE 250: Performed by: NURSE PRACTITIONER

## 2023-06-20 PROCEDURE — 96361 HYDRATE IV INFUSION ADD-ON: CPT

## 2023-06-20 PROCEDURE — D9220A PRA ANESTHESIA: ICD-10-PCS | Mod: CRNA,,, | Performed by: NURSE ANESTHETIST, CERTIFIED REGISTERED

## 2023-06-20 PROCEDURE — 83605 ASSAY OF LACTIC ACID: CPT | Performed by: PHYSICIAN ASSISTANT

## 2023-06-20 PROCEDURE — 80053 COMPREHEN METABOLIC PANEL: CPT | Performed by: NURSE PRACTITIONER

## 2023-06-20 PROCEDURE — G0378 HOSPITAL OBSERVATION PER HR: HCPCS

## 2023-06-20 PROCEDURE — 27201423 OPTIME MED/SURG SUP & DEVICES STERILE SUPPLY: Performed by: SURGERY

## 2023-06-20 PROCEDURE — 99285 EMERGENCY DEPT VISIT HI MDM: CPT | Mod: 25

## 2023-06-20 PROCEDURE — 63600175 PHARM REV CODE 636 W HCPCS

## 2023-06-20 PROCEDURE — 25000003 PHARM REV CODE 250: Performed by: SURGERY

## 2023-06-20 PROCEDURE — 71000015 HC POSTOP RECOV 1ST HR: Performed by: SURGERY

## 2023-06-20 PROCEDURE — 37000008 HC ANESTHESIA 1ST 15 MINUTES: Performed by: SURGERY

## 2023-06-20 PROCEDURE — 63600175 PHARM REV CODE 636 W HCPCS: Performed by: NURSE ANESTHETIST, CERTIFIED REGISTERED

## 2023-06-20 PROCEDURE — 83690 ASSAY OF LIPASE: CPT | Performed by: NURSE PRACTITIONER

## 2023-06-20 PROCEDURE — 84484 ASSAY OF TROPONIN QUANT: CPT | Performed by: NURSE PRACTITIONER

## 2023-06-20 PROCEDURE — 25000003 PHARM REV CODE 250: Performed by: NURSE ANESTHETIST, CERTIFIED REGISTERED

## 2023-06-20 PROCEDURE — 96374 THER/PROPH/DIAG INJ IV PUSH: CPT

## 2023-06-20 PROCEDURE — D9220A PRA ANESTHESIA: Mod: CRNA,,, | Performed by: NURSE ANESTHETIST, CERTIFIED REGISTERED

## 2023-06-20 PROCEDURE — 87040 BLOOD CULTURE FOR BACTERIA: CPT | Mod: 91 | Performed by: PHYSICIAN ASSISTANT

## 2023-06-20 PROCEDURE — 96376 TX/PRO/DX INJ SAME DRUG ADON: CPT | Mod: 59

## 2023-06-20 PROCEDURE — 96372 THER/PROPH/DIAG INJ SC/IM: CPT | Mod: 59 | Performed by: PHYSICIAN ASSISTANT

## 2023-06-20 PROCEDURE — 37000009 HC ANESTHESIA EA ADD 15 MINS: Performed by: SURGERY

## 2023-06-20 PROCEDURE — 71000033 HC RECOVERY, INTIAL HOUR: Performed by: SURGERY

## 2023-06-20 PROCEDURE — 63600175 PHARM REV CODE 636 W HCPCS: Performed by: PHYSICIAN ASSISTANT

## 2023-06-20 PROCEDURE — 71000016 HC POSTOP RECOV ADDL HR: Performed by: SURGERY

## 2023-06-20 PROCEDURE — 85025 COMPLETE CBC W/AUTO DIFF WBC: CPT | Performed by: NURSE PRACTITIONER

## 2023-06-20 PROCEDURE — 81025 URINE PREGNANCY TEST: CPT | Performed by: NURSE PRACTITIONER

## 2023-06-20 PROCEDURE — 96372 THER/PROPH/DIAG INJ SC/IM: CPT | Mod: 59

## 2023-06-20 PROCEDURE — 96375 TX/PRO/DX INJ NEW DRUG ADDON: CPT | Mod: 59

## 2023-06-20 PROCEDURE — 93005 ELECTROCARDIOGRAM TRACING: CPT

## 2023-06-20 PROCEDURE — 25000003 PHARM REV CODE 250: Performed by: PHYSICIAN ASSISTANT

## 2023-06-20 RX ORDER — PROMETHAZINE HYDROCHLORIDE 25 MG/ML
25 INJECTION, SOLUTION INTRAMUSCULAR; INTRAVENOUS
Status: COMPLETED | OUTPATIENT
Start: 2023-06-20 | End: 2023-06-20

## 2023-06-20 RX ORDER — ESMOLOL HYDROCHLORIDE 10 MG/ML
INJECTION INTRAVENOUS
Status: DISCONTINUED | OUTPATIENT
Start: 2023-06-20 | End: 2023-06-20

## 2023-06-20 RX ORDER — BUPIVACAINE HYDROCHLORIDE 5 MG/ML
INJECTION, SOLUTION EPIDURAL; INTRACAUDAL
Status: DISCONTINUED | OUTPATIENT
Start: 2023-06-20 | End: 2023-06-20 | Stop reason: HOSPADM

## 2023-06-20 RX ORDER — MORPHINE SULFATE 10 MG/ML
2 INJECTION INTRAMUSCULAR; INTRAVENOUS; SUBCUTANEOUS
Status: DISCONTINUED | OUTPATIENT
Start: 2023-06-20 | End: 2023-06-21 | Stop reason: HOSPADM

## 2023-06-20 RX ORDER — ACETAMINOPHEN 10 MG/ML
INJECTION, SOLUTION INTRAVENOUS
Status: DISCONTINUED | OUTPATIENT
Start: 2023-06-20 | End: 2023-06-20

## 2023-06-20 RX ORDER — DEXAMETHASONE SODIUM PHOSPHATE 4 MG/ML
INJECTION, SOLUTION INTRA-ARTICULAR; INTRALESIONAL; INTRAMUSCULAR; INTRAVENOUS; SOFT TISSUE
Status: DISCONTINUED | OUTPATIENT
Start: 2023-06-20 | End: 2023-06-20

## 2023-06-20 RX ORDER — ENOXAPARIN SODIUM 100 MG/ML
40 INJECTION SUBCUTANEOUS EVERY 24 HOURS
Status: DISCONTINUED | OUTPATIENT
Start: 2023-06-20 | End: 2023-06-21 | Stop reason: HOSPADM

## 2023-06-20 RX ORDER — HYDROMORPHONE HYDROCHLORIDE 2 MG/ML
INJECTION, SOLUTION INTRAMUSCULAR; INTRAVENOUS; SUBCUTANEOUS
Status: DISCONTINUED | OUTPATIENT
Start: 2023-06-20 | End: 2023-06-20

## 2023-06-20 RX ORDER — ONDANSETRON 4 MG/1
4 TABLET, ORALLY DISINTEGRATING ORAL
Status: COMPLETED | OUTPATIENT
Start: 2023-06-20 | End: 2023-06-20

## 2023-06-20 RX ORDER — HYDROMORPHONE HYDROCHLORIDE 2 MG/ML
0.4 INJECTION, SOLUTION INTRAMUSCULAR; INTRAVENOUS; SUBCUTANEOUS EVERY 5 MIN PRN
Status: DISCONTINUED | OUTPATIENT
Start: 2023-06-20 | End: 2023-06-20 | Stop reason: HOSPADM

## 2023-06-20 RX ORDER — SODIUM CHLORIDE, SODIUM LACTATE, POTASSIUM CHLORIDE, CALCIUM CHLORIDE 600; 310; 30; 20 MG/100ML; MG/100ML; MG/100ML; MG/100ML
INJECTION, SOLUTION INTRAVENOUS CONTINUOUS
Status: DISCONTINUED | OUTPATIENT
Start: 2023-06-20 | End: 2023-06-21

## 2023-06-20 RX ORDER — LIDOCAINE HYDROCHLORIDE 20 MG/ML
INJECTION, SOLUTION EPIDURAL; INFILTRATION; INTRACAUDAL; PERINEURAL
Status: DISCONTINUED | OUTPATIENT
Start: 2023-06-20 | End: 2023-06-20

## 2023-06-20 RX ORDER — HYDROMORPHONE HYDROCHLORIDE 2 MG/ML
INJECTION, SOLUTION INTRAMUSCULAR; INTRAVENOUS; SUBCUTANEOUS
Status: COMPLETED
Start: 2023-06-20 | End: 2023-06-20

## 2023-06-20 RX ORDER — ONDANSETRON 4 MG/1
8 TABLET, ORALLY DISINTEGRATING ORAL EVERY 8 HOURS PRN
Status: DISCONTINUED | OUTPATIENT
Start: 2023-06-20 | End: 2023-06-21 | Stop reason: HOSPADM

## 2023-06-20 RX ORDER — ONDANSETRON HYDROCHLORIDE 2 MG/ML
INJECTION, SOLUTION INTRAMUSCULAR; INTRAVENOUS
Status: DISCONTINUED | OUTPATIENT
Start: 2023-06-20 | End: 2023-06-20

## 2023-06-20 RX ORDER — GLYCOPYRROLATE 0.2 MG/ML
INJECTION INTRAMUSCULAR; INTRAVENOUS
Status: DISCONTINUED | OUTPATIENT
Start: 2023-06-20 | End: 2023-06-20

## 2023-06-20 RX ORDER — OXYCODONE HYDROCHLORIDE 5 MG/1
5 TABLET ORAL EVERY 4 HOURS PRN
Status: DISCONTINUED | OUTPATIENT
Start: 2023-06-20 | End: 2023-06-21 | Stop reason: HOSPADM

## 2023-06-20 RX ORDER — PROPOFOL 10 MG/ML
VIAL (ML) INTRAVENOUS
Status: DISCONTINUED | OUTPATIENT
Start: 2023-06-20 | End: 2023-06-20

## 2023-06-20 RX ORDER — MIDAZOLAM HYDROCHLORIDE 1 MG/ML
INJECTION INTRAMUSCULAR; INTRAVENOUS
Status: DISCONTINUED | OUTPATIENT
Start: 2023-06-20 | End: 2023-06-20

## 2023-06-20 RX ORDER — ONDANSETRON 2 MG/ML
4 INJECTION INTRAMUSCULAR; INTRAVENOUS EVERY 12 HOURS PRN
Status: DISCONTINUED | OUTPATIENT
Start: 2023-06-20 | End: 2023-06-21 | Stop reason: HOSPADM

## 2023-06-20 RX ORDER — ACETAMINOPHEN 325 MG/1
650 TABLET ORAL EVERY 4 HOURS
Status: DISCONTINUED | OUTPATIENT
Start: 2023-06-20 | End: 2023-06-21 | Stop reason: HOSPADM

## 2023-06-20 RX ORDER — OXYCODONE HYDROCHLORIDE 5 MG/1
10 TABLET ORAL EVERY 4 HOURS PRN
Status: DISCONTINUED | OUTPATIENT
Start: 2023-06-20 | End: 2023-06-21 | Stop reason: HOSPADM

## 2023-06-20 RX ORDER — TALC
6 POWDER (GRAM) TOPICAL NIGHTLY PRN
Status: DISCONTINUED | OUTPATIENT
Start: 2023-06-20 | End: 2023-06-21 | Stop reason: HOSPADM

## 2023-06-20 RX ORDER — ACETAMINOPHEN 325 MG/1
650 TABLET ORAL EVERY 8 HOURS PRN
Status: DISCONTINUED | OUTPATIENT
Start: 2023-06-20 | End: 2023-06-21 | Stop reason: HOSPADM

## 2023-06-20 RX ORDER — SODIUM CHLORIDE 9 MG/ML
INJECTION, SOLUTION INTRAVENOUS CONTINUOUS
Status: DISCONTINUED | OUTPATIENT
Start: 2023-06-20 | End: 2023-06-20

## 2023-06-20 RX ORDER — HYDROMORPHONE HYDROCHLORIDE 2 MG/ML
0.5 INJECTION, SOLUTION INTRAMUSCULAR; INTRAVENOUS; SUBCUTANEOUS
Status: COMPLETED | OUTPATIENT
Start: 2023-06-20 | End: 2023-06-20

## 2023-06-20 RX ORDER — ROCURONIUM BROMIDE 10 MG/ML
INJECTION, SOLUTION INTRAVENOUS
Status: DISCONTINUED | OUTPATIENT
Start: 2023-06-20 | End: 2023-06-20

## 2023-06-20 RX ORDER — FENTANYL CITRATE 50 UG/ML
INJECTION, SOLUTION INTRAMUSCULAR; INTRAVENOUS
Status: DISCONTINUED | OUTPATIENT
Start: 2023-06-20 | End: 2023-06-20

## 2023-06-20 RX ORDER — ENOXAPARIN SODIUM 100 MG/ML
40 INJECTION SUBCUTANEOUS EVERY 24 HOURS
Status: DISCONTINUED | OUTPATIENT
Start: 2023-06-20 | End: 2023-06-20

## 2023-06-20 RX ADMIN — HYDROMORPHONE HYDROCHLORIDE 0.4 MG: 2 INJECTION, SOLUTION INTRAMUSCULAR; INTRAVENOUS; SUBCUTANEOUS at 04:06

## 2023-06-20 RX ADMIN — SODIUM CHLORIDE 1000 ML: 9 INJECTION, SOLUTION INTRAVENOUS at 12:06

## 2023-06-20 RX ADMIN — FENTANYL CITRATE 100 MCG: 50 INJECTION, SOLUTION INTRAMUSCULAR; INTRAVENOUS at 03:06

## 2023-06-20 RX ADMIN — DEXAMETHASONE SODIUM PHOSPHATE 8 MG: 4 INJECTION, SOLUTION INTRA-ARTICULAR; INTRALESIONAL; INTRAMUSCULAR; INTRAVENOUS; SOFT TISSUE at 03:06

## 2023-06-20 RX ADMIN — ACETAMINOPHEN 650 MG: 325 TABLET, FILM COATED ORAL at 10:06

## 2023-06-20 RX ADMIN — SODIUM CHLORIDE: 9 INJECTION, SOLUTION INTRAVENOUS at 02:06

## 2023-06-20 RX ADMIN — ONDANSETRON 4 MG: 4 TABLET, ORALLY DISINTEGRATING ORAL at 10:06

## 2023-06-20 RX ADMIN — SODIUM CHLORIDE, POTASSIUM CHLORIDE, SODIUM LACTATE AND CALCIUM CHLORIDE: 600; 310; 30; 20 INJECTION, SOLUTION INTRAVENOUS at 09:06

## 2023-06-20 RX ADMIN — ENOXAPARIN SODIUM 40 MG: 40 INJECTION SUBCUTANEOUS at 03:06

## 2023-06-20 RX ADMIN — SODIUM CHLORIDE, SODIUM GLUCONATE, SODIUM ACETATE, POTASSIUM CHLORIDE AND MAGNESIUM CHLORIDE: 526; 502; 368; 37; 30 INJECTION, SOLUTION INTRAVENOUS at 03:06

## 2023-06-20 RX ADMIN — PIPERACILLIN AND TAZOBACTAM 4.5 G: 4; .5 INJECTION, POWDER, LYOPHILIZED, FOR SOLUTION INTRAVENOUS; PARENTERAL at 03:06

## 2023-06-20 RX ADMIN — SUGAMMADEX 200 MG: 100 INJECTION, SOLUTION INTRAVENOUS at 05:06

## 2023-06-20 RX ADMIN — HYDROMORPHONE HYDROCHLORIDE 0.4 MG: 2 INJECTION, SOLUTION INTRAMUSCULAR; INTRAVENOUS; SUBCUTANEOUS at 06:06

## 2023-06-20 RX ADMIN — ESMOLOL HYDROCHLORIDE 20 MG: 100 INJECTION, SOLUTION INTRAVENOUS at 04:06

## 2023-06-20 RX ADMIN — MIDAZOLAM HYDROCHLORIDE 2 MG: 1 INJECTION, SOLUTION INTRAMUSCULAR; INTRAVENOUS at 03:06

## 2023-06-20 RX ADMIN — ONDANSETRON 4 MG: 2 INJECTION INTRAMUSCULAR; INTRAVENOUS at 03:06

## 2023-06-20 RX ADMIN — ROCURONIUM BROMIDE 50 MG: 10 SOLUTION INTRAVENOUS at 03:06

## 2023-06-20 RX ADMIN — PROPOFOL 60 MG: 10 INJECTION, EMULSION INTRAVENOUS at 04:06

## 2023-06-20 RX ADMIN — HYDROMORPHONE HYDROCHLORIDE 0.2 MG: 2 INJECTION, SOLUTION INTRAMUSCULAR; INTRAVENOUS; SUBCUTANEOUS at 05:06

## 2023-06-20 RX ADMIN — LIDOCAINE HYDROCHLORIDE 80 MG: 20 INJECTION, SOLUTION EPIDURAL; INFILTRATION; INTRACAUDAL; PERINEURAL at 03:06

## 2023-06-20 RX ADMIN — SODIUM CHLORIDE, SODIUM GLUCONATE, SODIUM ACETATE, POTASSIUM CHLORIDE AND MAGNESIUM CHLORIDE: 526; 502; 368; 37; 30 INJECTION, SOLUTION INTRAVENOUS at 04:06

## 2023-06-20 RX ADMIN — ACETAMINOPHEN 1000 MG: 10 INJECTION, SOLUTION INTRAVENOUS at 04:06

## 2023-06-20 RX ADMIN — GLYCOPYRROLATE 0.2 MG: 0.2 INJECTION INTRAMUSCULAR; INTRAVENOUS at 03:06

## 2023-06-20 RX ADMIN — HYDROMORPHONE HYDROCHLORIDE 0.5 MG: 2 INJECTION INTRAMUSCULAR; INTRAVENOUS; SUBCUTANEOUS at 01:06

## 2023-06-20 RX ADMIN — PROMETHAZINE HYDROCHLORIDE 25 MG: 25 INJECTION INTRAMUSCULAR; INTRAVENOUS at 01:06

## 2023-06-20 RX ADMIN — PROPOFOL 100 MG: 10 INJECTION, EMULSION INTRAVENOUS at 03:06

## 2023-06-20 NOTE — ASSESSMENT & PLAN NOTE
Admitted to observation  NPO  Zosyn   IV fluids  Multimodal pain control   Lovenox  Plan for cholecystectomy in the next 24 hours

## 2023-06-20 NOTE — ANESTHESIA PREPROCEDURE EVALUATION
06/20/2023  Liz Magaña is a 42 y.o., female.      Pre-op Assessment    I have reviewed the Patient Summary Reports.     I have reviewed the Nursing Notes. I have reviewed the NPO Status.   I have reviewed the Medications.     Review of Systems  Anesthesia Hx:  No problems with previous Anesthesia    Hematology/Oncology:  Hematology Normal   Oncology Normal     EENT/Dental:EENT/Dental Normal   Cardiovascular:   Hypertension    Pulmonary:  Pulmonary Normal    Renal/:  Renal/ Normal     Hepatic/GI:  Hepatic/GI Normal    Musculoskeletal:  Musculoskeletal Normal    Neurological:  Neurology Normal    Endocrine:  Endocrine Normal    Dermatological:  Skin Normal    Psych:   Psychiatric History          Physical Exam  General: Well nourished, Cooperative, Alert and Oriented    Airway:  Mallampati: I   Mouth Opening: Normal  Neck ROM: Normal ROM    Dental:  Intact    Chest/Lungs:  Clear to auscultation    Heart:  Rate: Normal  Rhythm: Regular Rhythm        Anesthesia Plan  Type of Anesthesia, risks & benefits discussed:    Anesthesia Type: Gen ETT  Intra-op Monitoring Plan: Standard ASA Monitors  Post Op Pain Control Plan: multimodal analgesia  Induction:  IV  Airway Plan: Direct  Informed Consent: Informed consent signed with the Patient and all parties understand the risks and agree with anesthesia plan.  All questions answered.   ASA Score: 2  Day of Surgery Review of History & Physical: H&P Update referred to the surgeon/provider.I have interviewed and examined the patient. I have reviewed the patient's H&P dated:     Ready For Surgery From Anesthesia Perspective.     .

## 2023-06-20 NOTE — H&P
[facility] - Emergency Dept  General Surgery  History & Physical    Patient Name: Liz Magaña  MRN: [id number]  Admission Date: 2023  Attending Physician: Robert Astorga MD   Primary Care Provider: Denita Gonzalez MD    Patient information was obtained from patient and ER records.     Subjective:     Chief Complaint/Reason for Admission: abd pain    History of Present Illness: 42-year-old female with a 2 week history of intermittent right upper quadrant pain becoming more constant over the last 24-36 hours.  This is patient's 2nd ER visit.  Has increasing white blood cell count.  Cholelithiasis and likely cholecystitis noted on imaging.  Mild tenderness in right upper quadrant.  Nausea without vomiting.  No fevers.  Past surgical history is significant for 2  sections.  Past medical history significant for hypertension, anxiety, seasonal allergies.      Current Facility-Administered Medications on File Prior to Encounter   Medication    [COMPLETED] oxyCODONE-acetaminophen  mg per tablet 1 tablet     Current Outpatient Medications on File Prior to Encounter   Medication Sig    amLODIPine (NORVASC) 5 MG tablet TAKE 1 TABLET BY MOUTH EVERY DAY IN THE MORNING    busPIRone (BUSPAR) 15 MG tablet Take 1 tablet by mouth once daily.    ferrous sulfate (FEOSOL) 325 mg (65 mg iron) Tab tablet Take 325 mg by mouth daily as needed.    HYDROcodone-acetaminophen (NORCO) 7.5-325 mg per tablet Take 1 tablet by mouth every 6 (six) hours as needed for Pain.    loratadine (CLARITIN) 10 mg tablet Take 10 mg by mouth.    ondansetron (ZOFRAN-ODT) 4 MG TbDL Take 1 tablet (4 mg total) by mouth every 6 (six) hours as needed (nausea).       Review of patient's allergies indicates:  No Known Allergies    Past Medical History:   Diagnosis Date    Anxiety and depression 3/21/2023    Essential (primary) hypertension     Mixed hyperlipidemia 3/21/2023    Pre-diabetes     Seasonal allergies 2023     Vitamin D deficiency      Past Surgical History:   Procedure Laterality Date     SECTION  1996     SECTION  2006    EYE SURGERY  2000     Family History       Problem Relation (Age of Onset)    Diabetes Brother    Hypertension Mother          Tobacco Use    Smoking status: Never    Smokeless tobacco: Never   Substance and Sexual Activity    Alcohol use: Not Currently    Drug use: Never    Sexual activity: Not on file     Review of Systems   Constitutional:  Negative for chills, fatigue and fever.   HENT: Negative.     Eyes: Negative.    Respiratory: Negative.     Cardiovascular:  Negative for chest pain, palpitations and leg swelling.   Gastrointestinal:  Positive for abdominal pain and nausea. Negative for diarrhea and vomiting.   Endocrine: Negative.    Genitourinary: Negative.    Musculoskeletal: Negative.    Skin: Negative.    Neurological: Negative.    Psychiatric/Behavioral: Negative.     All other systems reviewed and are negative.  Objective:     Vital Signs (Most Recent):  Temp: 98 °F (36.7 °C) (23 1005)  Pulse: 100 (23 1230)  Resp: 18 (23 1336)  BP: (!) 142/97 (23 1230)  SpO2: 95 % (23 1230) Vital Signs (24h Range):  Temp:  [98 °F (36.7 °C)-98.6 °F (37 °C)] 98 °F (36.7 °C)  Pulse:  [100-106] 100  Resp:  [18-20] 18  SpO2:  [95 %-100 %] 95 %  BP: (142-155)/() 142/97     Weight: 89.4 kg (197 lb)  Body mass index is 36.03 kg/m².     Physical Exam  Vitals and nursing note reviewed.   Constitutional:       Appearance: Normal appearance.   HENT:      Head: Normocephalic and atraumatic.      Nose: Nose normal.   Eyes:      Pupils: Pupils are equal, round, and reactive to light.   Cardiovascular:      Rate and Rhythm: Normal rate and regular rhythm.      Pulses: Normal pulses.   Pulmonary:      Effort: Pulmonary effort is normal. No respiratory distress.   Chest:      Chest wall: No tenderness.   Abdominal:      General: Abdomen is  flat. There is no distension.      Palpations: Abdomen is soft.      Tenderness: There is abdominal tenderness.      Hernia: No hernia is present.   Musculoskeletal:         General: No deformity.      Cervical back: Normal range of motion.   Skin:     General: Skin is warm and dry.      Capillary Refill: Capillary refill takes less than 2 seconds.   Neurological:      General: No focal deficit present.      Mental Status: She is alert and oriented to person, place, and time. Mental status is at baseline.   Psychiatric:         Mood and Affect: Mood normal.         Thought Content: Thought content normal.         Judgment: Judgment normal.          I have reviewed all pertinent lab results within the past 24 hours.    Significant Diagnostics:  I have reviewed all pertinent imaging results/findings within the past 24 hours.      Assessment/Plan:     * Cholecystitis with cholelithiasis  Admitted to observation  NPO  Zosyn   IV fluids  Multimodal pain control   Lovenox  Plan for cholecystectomy in the next 24 hours      VTE Risk Mitigation (From admission, onward)         Ordered     enoxaparin injection 40 mg  Daily         06/20/23 1335     IP VTE HIGH RISK PATIENT  Once         06/20/23 1335     Place sequential compression device  Until discontinued         06/20/23 1335                URIEL Brennan  General Surgery  [facility] - Emergency Dept

## 2023-06-20 NOTE — FIRST PROVIDER EVALUATION
"Medical screening examination initiated.  I have conducted a focused provider triage encounter, findings are as follows:    Brief history of present illness:  43 y/o female presents with RUQ abdominal pain with nausea still. She was seen at [facility] yesterday evening/night and dx with gall stones. She was given nausea meds along with pain meds which she has yet to  from the pharmacy. She states she is nauseated and in pain so she came back here.     Vitals:    06/20/23 1005   BP: (!) 142/99   Pulse: 106   Resp: 20   Temp: 98 °F (36.7 °C)   TempSrc: Oral   SpO2: 98%   Weight: 89.4 kg (197 lb)   Height: 5' 2" (1.575 m)       Pertinent physical exam:  alert, nonlabored, ambulatory     Brief workup plan:  labs, ekg, urine    Preliminary workup initiated; this workup will be continued and followed by the physician or advanced practice provider that is assigned to the patient when roomed.  "

## 2023-06-20 NOTE — ED PROVIDER NOTES
"     Source of History:  Patient, no limitations    Chief complaint:  Abdominal Pain (Pt complaint of upper abd pain "as if it is in knots" for a week and it has affected her ability to eat with pt relating no relief with OTC meds, Rolaids, gas x and Colace)      HPI:  Liz Magaña is a 42 y.o. female presenting with Abdominal Pain (Pt complaint of upper abd pain "as if it is in knots" for a week and it has affected her ability to eat with pt relating no relief with OTC meds, Rolaids, gas x and Colace)       RUQ abd pain x 1 week, no fever, associated with some Nausea  Patient presents for evaluation of abdominal pain. Onset of symptoms was gradual starting 1 week ago with stable course since that time. The pain is located RUQ without radiation. The pain is rated as moderate. The pain is made worse by food and is relieved by nothing. The patient also complains of nausea. The patient denies fever. The past workup has included none on file. The pertinent past history includes none. Home care consisted of Rolaids and stool softeners with minimal relief.        Review of Systems   Constitutional symptoms:  Negative except as documented in HPI.   Skin symptoms:  Negative except as documented in HPI.   HEENT symptoms:  Negative except as documented in HPI.   Respiratory symptoms:  Negative except as documented in HPI.   Cardiovascular symptoms:  Negative except as documented in HPI.   Gastrointestinal symptoms:  Negative except as documented in HPI.    Genitourinary symptoms:  Negative except as documented in HPI.   Musculoskeletal symptoms:  Negative except as documented in HPI.   Neurologic symptoms:  Negative except as documented in HPI.   Psychiatric symptoms:  Negative except as documented in HPI.   Allergy/immunologic symptoms:  Negative except as documented in HPI.             Additional review of systems information: All other systems reviewed and otherwise negative.      Review of patient's allergies " "indicates:  No Known Allergies    PMH:  As per HPI and below:    Past Medical History:   Diagnosis Date    Anxiety and depression 3/21/2023    Essential (primary) hypertension     Mixed hyperlipidemia 3/21/2023    Pre-diabetes     Seasonal allergies 2023    Vitamin D deficiency         Family History   Problem Relation Age of Onset    Hypertension Mother     Diabetes Brother        Past Surgical History:   Procedure Laterality Date     SECTION  1996     SECTION  2006    EYE SURGERY  2000       Social History     Tobacco Use    Smoking status: Never    Smokeless tobacco: Never   Substance Use Topics    Alcohol use: Not Currently    Drug use: Never       Patient Active Problem List   Diagnosis    Primary hypertension    Seasonal allergies    Anxiety and depression    Iron deficiency anemia due to chronic blood loss    Mixed hyperlipidemia        Physical Exam:    BP (!) 155/109 (BP Location: Left arm, Patient Position: Sitting)   Pulse 105   Temp 98.6 °F (37 °C) (Oral)   Resp 18   Ht 5' 2" (1.575 m)   Wt 90.7 kg (200 lb)   LMP 2023 (Approximate)   SpO2 100%   BMI 36.58 kg/m²     Nursing note and vital signs reviewed.    General:  Alert, no acute distress.   Skin: Normal for Ethnic Origin, No cyanosis  HEENT: Normocephalic and atraumatic, Vision unchanged, Pupils symmetric, No icterus , Nasal mucosa is pink and moist  Cardiovascular:  Regular rate and rhythm, No edema  Chest Wall: No deformity, equal chest rise  Respiratory:  Lungs are clear to auscultation, respirations are non-labored.    Musculoskeletal:  No deformity, Normal perfusion to all extremities  Gastrointestinal:  Soft, Non distended, mild RUQ tenderness without rebound  Neurological:  Alert and oriented, normal motor observed, normal speech observed.    Psychiatric:  Cooperative, appropriate mood & affect.        Labs that have been ordered have been independently reviewed and interpreted by " myself.     Old Chart Reviewed.      Initial Impression/ Differential Dx:  GERD, intestinal spasm, gastroenteritis, gastritis, ulcer, cholecystitis, cholelithiasis, gallstones, pancreatitis, ileus, small bowel obstruction, appendicitis, diverticulitis, colitis, constipation, intestinal gas pain.      MDM:      Reviewed Nurses Note.    Reviewed Pertinent old records.    Orders Placed This Encounter    CT Abdomen Pelvis  Without Contrast    Urinalysis, Reflex to Urine Culture    Pregnancy, urine rapid    CBC Auto Differential    Comprehensive Metabolic Panel    Protime-INR    Lipase    CBC with Differential    Ambulatory referral/consult to General Surgery    ondansetron (ZOFRAN-ODT) 4 MG TbDL    HYDROcodone-acetaminophen (NORCO) 7.5-325 mg per tablet    oxyCODONE-acetaminophen  mg per tablet 1 tablet                    Labs Reviewed   URINALYSIS, REFLEX TO URINE CULTURE - Abnormal; Notable for the following components:       Result Value    Ketones, UA Trace (*)     All other components within normal limits   COMPREHENSIVE METABOLIC PANEL - Abnormal; Notable for the following components:    Blood Urea Nitrogen 6.4 (*)     Protein Total 8.5 (*)     Globulin 4.2 (*)     Albumin/Globulin Ratio 1.0 (*)     All other components within normal limits   PROTIME-INR - Abnormal; Notable for the following components:    INR 0.96 (*)     All other components within normal limits   PREGNANCY TEST, URINE RAPID - Normal   LIPASE - Normal   CBC W/ AUTO DIFFERENTIAL    Narrative:     The following orders were created for panel order CBC Auto Differential.  Procedure                               Abnormality         Status                     ---------                               -----------         ------                     CBC with Differential[[id number]]                            Final result                 Please view results for these tests on the individual orders.   CBC WITH DIFFERENTIAL          CT Abdomen Pelvis   Without Contrast   Final Result      1. Moderately distended gallbladder containing calculus without apparent dilatation of ducts.      2. Right kidney non occluding calculus.      3.  Pelvic small free fluid is favored to be physiologic.         Electronically signed by: Viktor Monsalve   Date:    06/19/2023   Time:    20:50           Admission on 06/19/2023   Component Date Value Ref Range Status    Color, UA 06/19/2023 Yellow  Yellow, Light-Yellow, Dark Yellow, Iris, Straw Final    Appearance, UA 06/19/2023 Clear  Clear Final    Specific Gravity, UA 06/19/2023 1.010   Final    pH, UA 06/19/2023 6.5  5.0 - 8.5 Final    Protein, UA 06/19/2023 Negative  Negative mg/dL Final    Glucose, UA 06/19/2023 Negative  Negative, Normal mg/dL Final    Ketones, UA 06/19/2023 Trace (A)  Negative mg/dL Final    Blood, UA 06/19/2023 Negative  Negative unit/L Final    Bilirubin, UA 06/19/2023 Negative  Negative mg/dL Final    Urobilinogen, UA 06/19/2023 0.2  0.2, 1.0, Normal mg/dL Final    Nitrites, UA 06/19/2023 Negative  Negative Final    Leukocyte Esterase, UA 06/19/2023 Negative  Negative unit/L Final    Beta hCG Qualitative, Urine 06/19/2023 Negative  Negative Final    Sodium Level 06/19/2023 138  136 - 145 mmol/L Final    Potassium Level 06/19/2023 3.5  3.5 - 5.1 mmol/L Final    Chloride 06/19/2023 104  98 - 107 mmol/L Final    Carbon Dioxide 06/19/2023 22  22 - 29 mmol/L Final    Glucose Level 06/19/2023 91  74 - 100 mg/dL Final    Blood Urea Nitrogen 06/19/2023 6.4 (L)  7.0 - 18.7 mg/dL Final    Creatinine 06/19/2023 0.85  0.55 - 1.02 mg/dL Final    Calcium Level Total 06/19/2023 10.2  8.4 - 10.2 mg/dL Final    Protein Total 06/19/2023 8.5 (H)  6.4 - 8.3 gm/dL Final    Albumin Level 06/19/2023 4.3  3.5 - 5.0 g/dL Final    Globulin 06/19/2023 4.2 (H)  2.4 - 3.5 gm/dL Final    Albumin/Globulin Ratio 06/19/2023 1.0 (L)  1.1 - 2.0 ratio Final    Bilirubin Total 06/19/2023 0.8  <=1.5 mg/dL Final    Alkaline Phosphatase 06/19/2023  80  40 - 150 unit/L Final    Alanine Aminotransferase 06/19/2023 15  0 - 55 unit/L Final    Aspartate Aminotransferase 06/19/2023 18  5 - 34 unit/L Final    eGFR 06/19/2023 >60  mls/min/1.73/m2 Final    PT 06/19/2023 12.9  11.7 - 14.5 seconds Final    INR 06/19/2023 0.96 (L)  2.00 - 3.00 Final    Lipase Level 06/19/2023 20  <=60 U/L Final    WBC 06/19/2023 8.66  4.50 - 11.50 x10(3)/mcL Final    RBC 06/19/2023 4.26  4.20 - 5.40 x10(6)/mcL Final    Hgb 06/19/2023 12.9  12.0 - 16.0 g/dL Final    Hct 06/19/2023 38.2  37.0 - 47.0 % Final    MCV 06/19/2023 89.7  80.0 - 94.0 fL Final    MCH 06/19/2023 30.3  27.0 - 31.0 pg Final    MCHC 06/19/2023 33.8  33.0 - 36.0 g/dL Final    RDW 06/19/2023 13.5  11.5 - 17.0 % Final    Platelet 06/19/2023 399  130 - 400 x10(3)/mcL Final    MPV 06/19/2023 8.5  7.4 - 10.4 fL Final    Neut % 06/19/2023 69.9  % Final    Lymph % 06/19/2023 23.0  % Final    Mono % 06/19/2023 5.8  % Final    Eos % 06/19/2023 0.8  % Final    Basophil % 06/19/2023 0.3  % Final    Lymph # 06/19/2023 1.99  0.6 - 4.6 x10(3)/mcL Final    Neut # 06/19/2023 6.05  2.1 - 9.2 x10(3)/mcL Final    Mono # 06/19/2023 0.50  0.1 - 1.3 x10(3)/mcL Final    Eos # 06/19/2023 0.07  0 - 0.9 x10(3)/mcL Final    Baso # 06/19/2023 0.03  <=0.2 x10(3)/mcL Final    IG# 06/19/2023 0.02  0 - 0.04 x10(3)/mcL Final    IG% 06/19/2023 0.2  % Final    NRBC% 06/19/2023 0.0  % Final       Imaging Results              CT Abdomen Pelvis  Without Contrast (Final result)  Result time 06/19/23 20:50:25      Final result by Viktor Monsalve MD (06/19/23 20:50:25)                   Impression:      1. Moderately distended gallbladder containing calculus without apparent dilatation of ducts.    2. Right kidney non occluding calculus.    3.  Pelvic small free fluid is favored to be physiologic.      Electronically signed by: Viktor Monsalve  Date:    06/19/2023  Time:    20:50               Narrative:    EXAMINATION:  CT ABDOMEN PELVIS WITHOUT  CONTRAST    CLINICAL HISTORY:  Abdominal pain, acute, nonlocalized;    TECHNIQUE:  Multidetector axial images were obtained from the  diaphragms to below symphysis pubis without the administration of IV contrast. Oral contrast was not administered.    Dose length product of 419 mGycm. Automated exposure control was utilized to minimize radiation dose.    COMPARISON:  None available    FINDINGS:  Included lungs are without suspicious nodularity, acute air space infiltrates or fluid within the pleural spaces.    Gallbladder is moderately distended and contains 2.2 cm rim calcified calculus.  No apparent dilatation of the biliary ducts.  Within limitations of noncontrast technique, no acute findings of the liver, pancreas and spleen identified.    The adrenal glands noncontrast evaluation is unremarkable.  There is right kidney interpolar location 5.0 cm non occluding calculus.  There is no hydronephrosis.  The ureters appear normal in course and diameter without intra ureteral stone.    The stomach is filled with residual ingested meal.  There is mild fluid distention of the distal esophagus which suggests reflux disease.  Small bowel is normal in caliber. There is no free air or free fluid. The appendix is not dilated. Pericolonic fat is preserved without acute inflammatory strandings. There is no evidence for bowel obstruction.    Urinary bladder appears within normal limits. No intravesical stone identified.  Uterus does not appear to be enlarged though the endometrium is not delineated.  There is pelvic small free fluid which is favored to be physiologic                                                                           Diagnostic Impression:    1. Calculus of gallbladder without cholecystitis without obstruction         ED Disposition Condition    Discharge Stable             Follow-up Information       [facility] - Emergency Dept.    Specialty: Emergency Medicine  Why: If symptoms  worsen  Contact information:  [address]  [facility] [id number] [phone]             Schedule an appointment as soon as possible for a visit  with Robert Astorga MD.    Specialty: General Surgery  Contact information:  [facility] [address]  [address]  [facility] [address]  [phone]                              ED Prescriptions       Medication Sig Dispense Start Date End Date Auth. Provider    ondansetron (ZOFRAN-ODT) 4 MG TbDL Take 1 tablet (4 mg total) by mouth every 6 (six) hours as needed (nausea). 15 tablet 6/19/2023 -- Jac Zhang, DO    HYDROcodone-acetaminophen (NORCO) 7.5-325 mg per tablet Take 1 tablet by mouth every 6 (six) hours as needed for Pain. 15 tablet 6/19/2023 -- Jac Zhang, DO          Follow-up Information       Follow up With Specialties Details Why Contact Info    [facility] - Emergency Dept Emergency Medicine  If symptoms worsen [address]  [facility] [id number] [phone]    Robert Astorga MD General Surgery Schedule an appointment as soon as possible for a visit   [address]  [address]  [facility] [address]  [phone]               Jac Zhang DO  06/19/23 2109

## 2023-06-20 NOTE — ED PROVIDER NOTES
Encounter Date: 2023       History     Chief Complaint   Patient presents with    Abdominal Pain     Pt seen @ [facility] ER yesterday and diagnosed with gallstones, but did not fill zofran and norco that was prescribed. Reports came here for increase RUQ pain and nausea.     42-year-old female with a history of anxiety, depression, hypertension, hyperlipidemia and seasonal allergies presents to the ED with continuous right upper quadrant abdominal pain that began 1 week ago.  Patient states the onset of her symptoms, it was intermittent however since yesterday has been constant.  States she was seen at [facility] yesterday, where they did labs and a CT scan noting that she had gallstones.  States they gave her medication which helped her symptoms and discharge her home to follow up with General surgery outpatient.  States she called the clinic however they have not received her referral yet.  Notes that her symptoms progress this morning and she got no relief from at-home [place].  Does note some intermittent nausea.  Denies fever, chills, vomiting, diarrhea, constipation.  Last known menstrual cycle was May 29th.    The history is provided by the patient. No  was used.   Review of patient's allergies indicates:  No Known Allergies  Past Medical History:   Diagnosis Date    Anxiety and depression 3/21/2023    Essential (primary) hypertension     Mixed hyperlipidemia 3/21/2023    Pre-diabetes     Seasonal allergies 2023    Vitamin D deficiency      Past Surgical History:   Procedure Laterality Date     SECTION  1996     SECTION  2006    EYE SURGERY  2000     Family History   Problem Relation Age of Onset    Hypertension Mother     Diabetes Brother      Social History     Tobacco Use    Smoking status: Never    Smokeless tobacco: Never   Substance Use Topics    Alcohol use: Not Currently    Drug use: Never     Review of Systems   Constitutional:  Negative for  chills and fever.   Eyes:  Negative for visual disturbance.   Respiratory:  Negative for cough and shortness of breath.    Cardiovascular:  Negative for chest pain.   Gastrointestinal:  Positive for abdominal pain and nausea. Negative for constipation, diarrhea and vomiting.   Genitourinary:  Negative for dysuria.   Musculoskeletal:  Negative for arthralgias.   Skin:  Negative for color change and rash.   Neurological:  Negative for dizziness and headaches.   Psychiatric/Behavioral:  Negative for behavioral problems.    All other systems reviewed and are negative.    Physical Exam     Initial Vitals [06/20/23 1005]   BP Pulse Resp Temp SpO2   (!) 142/99 106 20 98 °F (36.7 °C) 98 %      MAP       --         Physical Exam    Nursing note and vitals reviewed.  Constitutional: She appears well-developed and well-nourished.   HENT:   Head: Normocephalic and atraumatic.   Eyes: EOM are normal. Pupils are equal, round, and reactive to light.   Neck: Neck supple.   Cardiovascular:  Normal rate, regular rhythm and normal heart sounds.           Pulmonary/Chest: Breath sounds normal.   Abdominal: Abdomen is soft. Bowel sounds are normal. She exhibits no distension. There is abdominal tenderness (mild) in the right upper quadrant.   No right CVA tenderness.  No left CVA tenderness. There is no rebound, no guarding, no tenderness at McBurney's point and negative Corbin's sign. negative Rovsing's sign  Musculoskeletal:         General: Normal range of motion.      Cervical back: Neck supple.     Neurological: She is alert and oriented to person, place, and time. She has normal strength. GCS score is 15. GCS eye subscore is 4. GCS verbal subscore is 5. GCS motor subscore is 6.   Skin: Skin is warm and dry.   Psychiatric: She has a normal mood and affect.       ED Course   Procedures  Labs Reviewed   CBC WITH DIFFERENTIAL - Abnormal; Notable for the following components:       Result Value    WBC 13.83 (*)     Platelet 446 (*)      Neut # 11.65 (*)     IG# 0.07 (*)     All other components within normal limits   COMPREHENSIVE METABOLIC PANEL - Abnormal; Notable for the following components:    Glucose Level 121 (*)     Blood Urea Nitrogen 6.7 (*)     Globulin 3.9 (*)     All other components within normal limits   LIPASE - Normal   PREGNANCY TEST, URINE RAPID - Normal   TROPONIN I - Normal   BLOOD CULTURE OLG   BLOOD CULTURE OLG   LACTIC ACID, PLASMA     EKG Readings: (Independently Interpreted)   Initial Reading: No STEMI. Rhythm: Sinus Tachycardia. Heart Rate: 108. Ectopy: No Ectopy. Conduction: Normal. ST Segments: Normal ST Segments. T Waves: Normal. Axis: Normal.     Imaging Results              US Abdomen Limited (Final result)  Result time 06/20/23 12:37:21      Final result by Marshal Tariq MD (06/20/23 12:37:21)                   Impression:      Cholelithiasis with minimal gallbladder wall thickening and positive sonographic Corbin sign.  Cholecystitis is possible.    Hepatic steatosis.      Electronically signed by: Marshal Tariq  Date:    06/20/2023  Time:    12:37               Narrative:    EXAMINATION:  US ABDOMEN LIMITED    CLINICAL HISTORY:  symptomatic cholelithiasis;    TECHNIQUE:  Gray-scale and color Doppler ultrasound images of the abdomen.    COMPARISON:  CT 06/19/2023    FINDINGS:  Pancreas not well visualized.  Imaged segments of the abdominal aorta and IVC normal in caliber.    Liver has normal size with mild increase in overall hepatic echogenicity.  Main portal vein patent with normal flow direction.    No hydronephrosis in the right kidney.    32 mm gallstone.  Minimal gallbladder wall thickening measuring 4 mm.  Positive sonographic Corbin sign.  Normal CBD caliber.    Measurements:    - Liver: 16.2 cm    - CBD diameter: 3 mm    - Right kidney: 10.2 cm in length                                       Medications   piperacillin-tazobactam (ZOSYN) 4.5 g in dextrose 5 % in water (D5W) 5 % 100 mL IVPB (MB+) (has no  administration in time range)   0.9%  NaCl infusion ( Intravenous New Bag 6/20/23 1407)   enoxaparin injection 40 mg (has no administration in time range)   ondansetron disintegrating tablet 8 mg (has no administration in time range)   ondansetron injection 4 mg (has no administration in time range)   melatonin tablet 6 mg (has no administration in time range)   acetaminophen tablet 650 mg (has no administration in time range)   acetaminophen tablet 650 mg (has no administration in time range)   oxyCODONE immediate release tablet 5 mg (has no administration in time range)   oxyCODONE immediate release tablet Tab 10 mg (has no administration in time range)   ondansetron disintegrating tablet 4 mg (4 mg Oral Given 6/20/23 1008)   sodium chloride 0.9% bolus 1,000 mL 1,000 mL (1,000 mLs Intravenous New Bag 6/20/23 1228)   HYDROmorphone (PF) injection 0.5 mg (0.5 mg Intravenous Given 6/20/23 1336)   promethazine injection 25 mg (25 mg Intramuscular Given 6/20/23 1336)     Medical Decision Making:   Initial Assessment:   42-year-old female with a history of anxiety, depression, hypertension, hyperlipidemia and seasonal allergies presents to the ED with continuous right upper quadrant abdominal pain that began 1 week ago.  Patient states the onset of her symptoms, it was intermittent however since yesterday has been constant.  States she was seen at [facility] yesterday, where they did labs and a CT scan noting that she had gallstones.  States they gave her medication which helped her symptoms and discharge her home to follow up with General surgery outpatient.  States she called the clinic however they have not received her referral yet.  Notes that her symptoms progress this morning and she got no relief from at-home [place].  Does note some intermittent nausea.  Denies fever, chills, vomiting, diarrhea, constipation.  Last known menstrual cycle was May 29th.    Differential Diagnosis:   Symptomatic cholelithiasis, biliary  colic, cholecystitis, choledocholithiasis, intestinal gas pain, constipation, GERD, ACS  Clinical Tests:   Lab Tests: Reviewed and Ordered  Radiological Study: Reviewed and Ordered  Medical Tests: Ordered and Reviewed           ED Course as of 06/20/23 1417   Tue Jun 20, 2023   1141 WBC(!): 13.83  Leukocytosis compared to yesterday's lab work [MA]   1248 US noting cholelithiasis with minimal gallbladder wall thickening and positive sonographic Corbin sign; cholecystitis is possible. General surgery paged  [MA]   2161 I had face to face time with this patient and independently performed a history and physical exam. I was involved in a substantive portion of this patient's ED visit and MDM I have discussed the assessment, plan, and results with both midlevel provider and patient. I agree with the assessment and plan of the midlevel provider. I agree with the history and physical exam as well as the differential/final diagnosis and disposition as documented in the chart by the midlevel provider Arnav Petersen.     HPI:  42-year-old female presents for worsening right upper quadrant abdominal pain.  She states she was seen at the [facility] ER yesterday and diagnosed with gallstones, however she was unable to fill her pain and nausea prescriptions.     Physical exam:   GEN: Awake, alert, no distress  HEENT: NC/AT  CV: RRR, no murmurs   PULM: Lungs CTAB, normal work of breathing  ABD: Abd soft, ND, TTP to RUQ  NEURO: GCS15, no focal neurologic deficits    MDM:  42-year-old female presents for right upper quadrant abdominal pain.  Labs with a leukocytosis of 13.8.  Ultrasound notes cholelithiasis with some gallbladder wall thickening and positive sonographic Corbin sign.  General surgery consulted and will evaluate the patient.  Continue pain and nausea control.  Antibiotics to be initiated.  Ultimately plan for admission for surgery evaluation and likely operative intervention.  Patient agrees with plan of care and all  questions answered at bedside.    Angus Shoemaker MD []   1256 Gen surgery to come down and see patient  [MA]      ED Course User Index  [MA] Arnav Petersen PA-C  [] Angus Shoemaker MD                 Clinical Impression:   Final diagnoses:  [R10.10] Upper abdominal pain  [K81.0] Acute cholecystitis (Primary)        ED Disposition Condition    Observation Stable                Arnav Petersen PA-C  06/20/23 1035

## 2023-06-20 NOTE — ANESTHESIA POSTPROCEDURE EVALUATION
Anesthesia Post Evaluation    Patient: Liz Magaña    Procedure(s) Performed: Procedure(s) (LRB):  CHOLECYSTECTOMY, LAPAROSCOPIC (N/A)    Final Anesthesia Type: general      Patient location during evaluation: PACU  Patient participation: Yes- Able to Participate  Level of consciousness: awake and alert  Post-procedure vital signs: reviewed and stable  Pain management: adequate  Airway patency: patent  REJI mitigation strategies: Multimodal analgesia  PONV status at discharge: No PONV  Anesthetic complications: no      Cardiovascular status: blood pressure returned to baseline and hemodynamically stable  Respiratory status: unassisted  Hydration status: euvolemic  Follow-up not needed.          Vitals Value Taken Time   BP 99/73 06/20/23 1842   Temp  06/20/23 1845   Pulse 91 06/20/23 1845   Resp  06/20/23 1845   SpO2 100 % 06/20/23 1845   Vitals shown include unvalidated device data.      No case tracking events are documented in the log.      Pain/Sonia Score: Pain Rating Prior to Med Admin: 8 (6/20/2023  1:36 PM)

## 2023-06-20 NOTE — SUBJECTIVE & OBJECTIVE
Current Facility-Administered Medications on File Prior to Encounter   Medication    [COMPLETED] oxyCODONE-acetaminophen  mg per tablet 1 tablet     Current Outpatient Medications on File Prior to Encounter   Medication Sig    amLODIPine (NORVASC) 5 MG tablet TAKE 1 TABLET BY MOUTH EVERY DAY IN THE MORNING    busPIRone (BUSPAR) 15 MG tablet Take 1 tablet by mouth once daily.    ferrous sulfate (FEOSOL) 325 mg (65 mg iron) Tab tablet Take 325 mg by mouth daily as needed.    HYDROcodone-acetaminophen (NORCO) 7.5-325 mg per tablet Take 1 tablet by mouth every 6 (six) hours as needed for Pain.    loratadine (CLARITIN) 10 mg tablet Take 10 mg by mouth.    ondansetron (ZOFRAN-ODT) 4 MG TbDL Take 1 tablet (4 mg total) by mouth every 6 (six) hours as needed (nausea).       Review of patient's allergies indicates:  No Known Allergies    Past Medical History:   Diagnosis Date    Anxiety and depression 3/21/2023    Essential (primary) hypertension     Mixed hyperlipidemia 3/21/2023    Pre-diabetes     Seasonal allergies 2023    Vitamin D deficiency      Past Surgical History:   Procedure Laterality Date     SECTION  1996     SECTION  2006    EYE SURGERY  2000     Family History       Problem Relation (Age of Onset)    Diabetes Brother    Hypertension Mother          Tobacco Use    Smoking status: Never    Smokeless tobacco: Never   Substance and Sexual Activity    Alcohol use: Not Currently    Drug use: Never    Sexual activity: Not on file     Review of Systems   Constitutional:  Negative for chills, fatigue and fever.   HENT: Negative.     Eyes: Negative.    Respiratory: Negative.     Cardiovascular:  Negative for chest pain, palpitations and leg swelling.   Gastrointestinal:  Positive for abdominal pain and nausea. Negative for diarrhea and vomiting.   Endocrine: Negative.    Genitourinary: Negative.    Musculoskeletal: Negative.    Skin: Negative.    Neurological: Negative.     Psychiatric/Behavioral: Negative.     All other systems reviewed and are negative.  Objective:     Vital Signs (Most Recent):  Temp: 98 °F (36.7 °C) (06/20/23 1005)  Pulse: 100 (06/20/23 1230)  Resp: 18 (06/20/23 1336)  BP: (!) 142/97 (06/20/23 1230)  SpO2: 95 % (06/20/23 1230) Vital Signs (24h Range):  Temp:  [98 °F (36.7 °C)-98.6 °F (37 °C)] 98 °F (36.7 °C)  Pulse:  [100-106] 100  Resp:  [18-20] 18  SpO2:  [95 %-100 %] 95 %  BP: (142-155)/() 142/97     Weight: 89.4 kg (197 lb)  Body mass index is 36.03 kg/m².     Physical Exam  Vitals and nursing note reviewed.   Constitutional:       Appearance: Normal appearance.   HENT:      Head: Normocephalic and atraumatic.      Nose: Nose normal.   Eyes:      Pupils: Pupils are equal, round, and reactive to light.   Cardiovascular:      Rate and Rhythm: Normal rate and regular rhythm.      Pulses: Normal pulses.   Pulmonary:      Effort: Pulmonary effort is normal. No respiratory distress.   Chest:      Chest wall: No tenderness.   Abdominal:      General: Abdomen is flat. There is no distension.      Palpations: Abdomen is soft.      Tenderness: There is abdominal tenderness.      Hernia: No hernia is present.   Musculoskeletal:         General: No deformity.      Cervical back: Normal range of motion.   Skin:     General: Skin is warm and dry.      Capillary Refill: Capillary refill takes less than 2 seconds.   Neurological:      General: No focal deficit present.      Mental Status: She is alert and oriented to person, place, and time. Mental status is at baseline.   Psychiatric:         Mood and Affect: Mood normal.         Thought Content: Thought content normal.         Judgment: Judgment normal.          I have reviewed all pertinent lab results within the past 24 hours.    Significant Diagnostics:  I have reviewed all pertinent imaging results/findings within the past 24 hours.

## 2023-06-20 NOTE — HPI
42-year-old female with a 2 week history of intermittent right upper quadrant pain becoming more constant over the last 24-36 hours.  This is patient's 2nd ER visit.  Has increasing white blood cell count.  Cholelithiasis and likely cholecystitis noted on imaging.  Mild tenderness in right upper quadrant.  Nausea without vomiting.  No fevers.  Past surgical history is significant for 2  sections.  Past medical history significant for hypertension, anxiety, seasonal allergies.

## 2023-06-20 NOTE — TRANSFER OF CARE
"Anesthesia Transfer of Care Note    Patient: Liz Magaña    Procedure(s) Performed: Procedure(s) (LRB):  CHOLECYSTECTOMY, LAPAROSCOPIC (N/A)    Patient location: PACU    Anesthesia Type: general    Transport from OR: Transported from OR on room air with adequate spontaneous ventilation    Post pain: adequate analgesia    Post assessment: no apparent anesthetic complications and tolerated procedure well    Post vital signs: stable    Level of consciousness: awake, alert and responds to stimulation    Nausea/Vomiting: no nausea/vomiting    Complications: none    Transfer of care protocol was followed      Last vitals:   Visit Vitals  /75   Pulse 103   Temp 36.7 °C (98 °F) (Oral)   Resp 20   Ht 5' 2" (1.575 m)   Wt 89.4 kg (197 lb)   LMP 05/29/2023 (Approximate)   SpO2 100%   BMI 36.03 kg/m²     "

## 2023-06-21 VITALS
WEIGHT: 196.19 LBS | SYSTOLIC BLOOD PRESSURE: 122 MMHG | HEART RATE: 106 BPM | BODY MASS INDEX: 36.1 KG/M2 | HEIGHT: 62 IN | RESPIRATION RATE: 17 BRPM | DIASTOLIC BLOOD PRESSURE: 86 MMHG | TEMPERATURE: 99 F | OXYGEN SATURATION: 97 %

## 2023-06-21 PROBLEM — R10.10 UPPER ABDOMINAL PAIN: Status: ACTIVE | Noted: 2023-06-21

## 2023-06-21 PROBLEM — K81.0 ACUTE CHOLECYSTITIS: Status: ACTIVE | Noted: 2023-06-21

## 2023-06-21 LAB
ALBUMIN SERPL-MCNC: 3.4 G/DL (ref 3.5–5)
ALBUMIN/GLOB SERPL: 1.1 RATIO (ref 1.1–2)
ALP SERPL-CCNC: 69 UNIT/L (ref 40–150)
ALT SERPL-CCNC: 83 UNIT/L (ref 0–55)
AST SERPL-CCNC: 95 UNIT/L (ref 5–34)
BASOPHILS # BLD AUTO: 0.01 X10(3)/MCL
BASOPHILS NFR BLD AUTO: 0.1 %
BILIRUBIN DIRECT+TOT PNL SERPL-MCNC: 0.7 MG/DL
BUN SERPL-MCNC: 5.2 MG/DL (ref 7–18.7)
CALCIUM SERPL-MCNC: 8.3 MG/DL (ref 8.4–10.2)
CHLORIDE SERPL-SCNC: 105 MMOL/L (ref 98–107)
CO2 SERPL-SCNC: 23 MMOL/L (ref 22–29)
CREAT SERPL-MCNC: 0.82 MG/DL (ref 0.55–1.02)
EOSINOPHIL # BLD AUTO: 0 X10(3)/MCL (ref 0–0.9)
EOSINOPHIL NFR BLD AUTO: 0 %
ERYTHROCYTE [DISTWIDTH] IN BLOOD BY AUTOMATED COUNT: 13.7 % (ref 11.5–17)
GFR SERPLBLD CREATININE-BSD FMLA CKD-EPI: >60 MLS/MIN/1.73/M2
GLOBULIN SER-MCNC: 3.2 GM/DL (ref 2.4–3.5)
GLUCOSE SERPL-MCNC: 119 MG/DL (ref 74–100)
HCT VFR BLD AUTO: 33.8 % (ref 37–47)
HGB BLD-MCNC: 10.9 G/DL (ref 12–16)
IMM GRANULOCYTES # BLD AUTO: 0.06 X10(3)/MCL (ref 0–0.04)
IMM GRANULOCYTES NFR BLD AUTO: 0.5 %
LYMPHOCYTES # BLD AUTO: 1.2 X10(3)/MCL (ref 0.6–4.6)
LYMPHOCYTES NFR BLD AUTO: 10.8 %
MCH RBC QN AUTO: 29.9 PG (ref 27–31)
MCHC RBC AUTO-ENTMCNC: 32.2 G/DL (ref 33–36)
MCV RBC AUTO: 92.6 FL (ref 80–94)
MONOCYTES # BLD AUTO: 0.82 X10(3)/MCL (ref 0.1–1.3)
MONOCYTES NFR BLD AUTO: 7.4 %
NEUTROPHILS # BLD AUTO: 9.01 X10(3)/MCL (ref 2.1–9.2)
NEUTROPHILS NFR BLD AUTO: 81.2 %
NRBC BLD AUTO-RTO: 0 %
PLATELET # BLD AUTO: 371 X10(3)/MCL (ref 130–400)
PMV BLD AUTO: 8.7 FL (ref 7.4–10.4)
POTASSIUM SERPL-SCNC: 4 MMOL/L (ref 3.5–5.1)
PROT SERPL-MCNC: 6.6 GM/DL (ref 6.4–8.3)
RBC # BLD AUTO: 3.65 X10(6)/MCL (ref 4.2–5.4)
SODIUM SERPL-SCNC: 138 MMOL/L (ref 136–145)
WBC # SPEC AUTO: 11.1 X10(3)/MCL (ref 4.5–11.5)

## 2023-06-21 PROCEDURE — 80053 COMPREHEN METABOLIC PANEL: CPT | Performed by: NURSE PRACTITIONER

## 2023-06-21 PROCEDURE — 94761 N-INVAS EAR/PLS OXIMETRY MLT: CPT

## 2023-06-21 PROCEDURE — G0378 HOSPITAL OBSERVATION PER HR: HCPCS

## 2023-06-21 PROCEDURE — 85025 COMPLETE CBC W/AUTO DIFF WBC: CPT | Performed by: NURSE PRACTITIONER

## 2023-06-21 PROCEDURE — 96361 HYDRATE IV INFUSION ADD-ON: CPT

## 2023-06-21 PROCEDURE — 25000003 PHARM REV CODE 250: Performed by: NURSE PRACTITIONER

## 2023-06-21 RX ORDER — OXYCODONE HYDROCHLORIDE 5 MG/1
5 TABLET ORAL EVERY 4 HOURS PRN
Qty: 8 TABLET | Refills: 0 | Status: SHIPPED | OUTPATIENT
Start: 2023-06-21 | End: 2023-07-07

## 2023-06-21 RX ORDER — OXYCODONE HYDROCHLORIDE 5 MG/1
5 TABLET ORAL EVERY 4 HOURS PRN
Qty: 8 TABLET | Refills: 0 | Status: SHIPPED | OUTPATIENT
Start: 2023-06-21 | End: 2023-06-21 | Stop reason: SDUPTHER

## 2023-06-21 RX ADMIN — ACETAMINOPHEN 650 MG: 325 TABLET, FILM COATED ORAL at 05:06

## 2023-06-21 NOTE — DISCHARGE SUMMARY
DISCHARGE SUMMARY    Admit Date: 2023  Discharge Date: 2023  Admitting Physician: Robert Astorga MD  Consulting Physicians(s): none    Admission HPI:   42-year-old female with a 2 week history of intermittent right upper quadrant pain becoming more constant over the last 24-36 hours.  This is patient's 2nd ER visit.  Has increasing white blood cell count.  Cholelithiasis and likely cholecystitis noted on imaging.  Mild tenderness in right upper quadrant.  Nausea without vomiting.  No fevers.  Past surgical history is significant for 2  sections.  Past medical history significant for hypertension, anxiety, seasonal allergies.    Hospital Course:   Pt was admitted on  with acute cholecystitis. She was taken to the OR later the same day for a lap harsh, which she tolerated without complication. By , pt was tolerating a regular diet, voiding, ambulating, and her pain was controlled with oral pain meds. She was deemed stable for discharge home at this time.    Procedures Performed:   Lap cholecystectomy    Discharge Condition: good    Disposition: Home or Self Care    Follow-Up Plan:    Follow-up Information       Denita Gonzalez MD. Go on 2023.    Specialty: Family Medicine  Why: @ 11  Contact information:  [address] [place] [id number]  [phone]                              Discharge Instructions:   Activity: as tolerated, no heavy lifting for 3 weeks  Diet: regular  Wound Care: may shower, no bathing or soaking incisions for 1 week    Discharge Medications:     Medication List        START taking these medications      oxyCODONE 5 MG immediate release tablet  Commonly known as: ROXICODONE  Take 1 tablet (5 mg total) by mouth every 4 (four) hours as needed for Pain.            CONTINUE taking these medications      amLODIPine 5 MG tablet  Commonly known as: NORVASC  TAKE 1 TABLET BY MOUTH EVERY DAY IN THE MORNING     busPIRone 15 MG tablet  Commonly known as: BUSPAR      ferrous sulfate 325 mg (65 mg iron) Tab tablet  Commonly known as: FEOSOL     HYDROcodone-acetaminophen 7.5-325 mg per tablet  Commonly known as: NORCO  Take 1 tablet by mouth every 6 (six) hours as needed for Pain.     loratadine 10 mg tablet  Commonly known as: CLARITIN     ondansetron 4 MG Tbdl  Commonly known as: ZOFRAN-ODT  Take 1 tablet (4 mg total) by mouth every 6 (six) hours as needed (nausea).               Where to Get Your Medications        You can get these medications from any pharmacy    Bring a paper prescription for each of these medications  oxyCODONE 5 MG immediate release tablet        Ilda Espinosa MD   [employer] General Surgery, PGY-1

## 2023-06-21 NOTE — OP NOTE
[facility]                       [address] [id number]    PATIENT NAME:      LARISA GALLAGHER   YOB: 1980  CSN:               [id number]  MRN:               [id number]  ADMIT DATE:        06/20/2023 10:10:00  PHYSICIAN:         Robert Astorga MD                          OPERATIVE REPORT      DATE OF SURGERY:    06/20/2023 00:00:00    SURGEON:  Robert Astorga MD    PROCEDURE:  Laparoscopic cholecystectomy.    PREOPERATIVE DIAGNOSIS:  Acute cholecystitis, cholelithiasis.    POSTOPERATIVE DIAGNOSIS:  Acute cholecystitis, cholelithiasis.    ANESTHESIA:  General endotracheal anesthesia with local to sites.    COMPLICATIONS:  None.    ESTIMATED BLOOD LOSS:  50.    CONDITION:  Good.    SPECIMEN:  Gallbladder.    ASSISTANT:  Tona Doshi MD.    INDICATION FOR PROCEDURE:  This is a 42-year-old female with a 1-2 day history   of severe upper abdominal pain.  She has had a long history of symptoms, but it   got more severe over the past couple of days.  She has an ultrasound that shows   a thickened gallbladder and a large gallstone.  We will take her to the   operating room for laparoscopic cholecystectomy.    FINDINGS:  Severely inflamed gallbladder, obvious biliary anatomy, large   gallstone in the infundibulum of the gallbladder.    PROCEDURE IN DETAIL:  Patient was brought to the operating room.  She was   brought under general endotracheal anesthesia.  She was prepped and draped in   sterile fashion.  Antibiotics given.  Lovenox was given, and a time-out was   called.  A supraumbilical Veress access via abdomen was performed by water drop   test.  There was no evidence of bowel or trocar injury.  The abdomen was   insufflated to 15 mmHg.  Three 5 mm ports and a subxiphoid 11 mm port were   placed.  The gallbladder was retracted.  There were inflammatory adhesions to   the omentum and the gallbladder and the  liver that were taken down with Bovie   cautery.  There was a beginning of a rind in the gallbladder as well. The gallbladder was hyperemic and tense and needed to be needle decompressed for grasping. The   triangle of Calot was eventually dissected out.  There was no obvious cystic   duct and cystic artery.  There was a large stone in the infundibulum.  We did   have to mobilize the stone in order to more appropriately retract the   gallbladder.  The cystic artery was clipped twice proximally, once distally, and   transected.  The cystic duct was clipped 3 times proximally, once distally, and   transected.  The gallbladder was removed from the gallbladder bed with Bovie   cautery.  It was very friable and intrahepatic.  The gallbladder was removed   from the abdomen via the EndoCatch bag.  The liver bed was made hemostatic.    There was an additional posterior branch of the cystic artery that was clipped   once proximally and once distally as well.  The abdomen was inspected.  The   abdomen was suctioned and irrigated dry.  Lap, sponge, needle, and instrument   counts were correct.  The abdomen was desufflated.  Port sites removed.  The   port sites closed with 4-0 Vicryl.  The subxiphoid port was widened to admit the   large gallbladder and the large stone and was closed with a running 0 Vicryl in   the fascia.  Dressings were applied.  Patient tolerated procedure well, was   awoken without difficulty and brought to the recovery room without further   event.        ______________________________  MD SVETA June/EVONNE  DD:  06/20/2023  Time:  05:38PM  DT:  06/20/2023  Time:  11:27PM  Job #:  [id number]      OPERATIVE REPORT

## 2023-06-21 NOTE — NURSING
Nurses Note -- 4 Eyes      6/21/2023   8:30 pm      Skin assessed during: Admit      [x] No Altered Skin Integrity Present    []Prevention Measures Documented      [] Yes- Altered Skin Integrity Present or Discovered   [] LDA Added if Not in Epic (Describe Wound)   [] New Altered Skin Integrity was Present on Admit and Documented in LDA   [] Wound Image Taken    Wound Care Consulted? No    Attending Nurse:  Ena Beckman LPN     Second RN/Staff Member:  Nitza Patel RN       Abdominal Surgical incision lap sites with steri-strips clean, dry, and intact

## 2023-06-21 NOTE — PLAN OF CARE
06/21/23 1329   Discharge Assessment   Assessment Type Discharge Planning Assessment   Confirmed/corrected address, phone number and insurance Yes   Confirmed Demographics Correct on Facesheet   Source of Information patient   Does patient/caregiver understand observation status Yes   Communicated CANDE with patient/caregiver Yes   Reason For Admission ABD PAIN   People in Home child(kevin), adult   Facility Arrived From: HOME   Do you expect to return to your current living situation? Yes   Do you have help at home or someone to help you manage your care at home? Yes   Who are your caregiver(s) and their phone number(s)? 27 Y/O SON AND 18 Y/O DGTR   Prior to hospitilization cognitive status: Unable to Assess   Current cognitive status: Alert/Oriented   Equipment Currently Used at Home none   Readmission within 30 days? No   Patient currently being followed by outpatient case management? No   Do you currently have service(s) that help you manage your care at home? No   Do you take prescription medications? Yes   Do you have prescription coverage? Yes   Coverage BCBS   Do you have any problems affording any of your prescribed medications? No   Who is going to help you get home at discharge? FAMILY   How do you get to doctors appointments? car, drives self   Are you on dialysis? No   Discharge Plan A Home with family   Discharge Plan B Home with family   DME Needed Upon Discharge  none   Financial Resource Strain   How hard is it for you to pay for the very basics like food, housing, medical care, and heating? Not hard   Housing Stability   In the last 12 months, was there a time when you were not able to pay the mortgage or rent on time? N   In the last 12 months, was there a time when you did not have a steady place to sleep or slept in a shelter (including now)? N   Transportation Needs   In the past 12 months, has lack of transportation kept you from medical appointments or from getting medications? no   In the  past 12 months, has lack of transportation kept you from meetings, work, or from getting things needed for daily living? No   OTHER   Name(s) of People in Home SON DGTR./ADULTS     PT WILL DISCH HOME TODAY.  NO NEEDS ID.  SHE IS SELF EMPLOYED AND ADULT CHILDREN ARE VERY SUPPORTIVE.

## 2023-06-21 NOTE — PROGRESS NOTES
"   Acute Care Surgery   Progress Note  Admit Date: 6/20/2023  HD#0  POD#1 Day Post-Op    Subjective:   Interval history:  Taken to OR yesterday for lap harsh - see op note for details  Afebrile   Tolerated CLD   Pain controlled   Urinating w/o issues     Home Meds:  Current Outpatient Medications   Medication Instructions    amLODIPine (NORVASC) 5 MG tablet TAKE 1 TABLET BY MOUTH EVERY DAY IN THE MORNING    busPIRone (BUSPAR) 15 MG tablet 1 tablet, Oral, Daily    ferrous sulfate (FEOSOL) 325 mg, Oral, Daily PRN    HYDROcodone-acetaminophen (NORCO) 7.5-325 mg per tablet 1 tablet, Oral, Every 6 hours PRN    loratadine (CLARITIN) 10 mg, Oral    ondansetron (ZOFRAN-ODT) 4 mg, Oral, Every 6 hours PRN      Scheduled Meds:   acetaminophen  650 mg Oral Q4H    enoxparin  40 mg Subcutaneous Daily     Continuous Infusions:  PRN Meds:acetaminophen, melatonin, morphine, ondansetron, ondansetron, oxyCODONE, oxyCODONE     Objective:     VITAL SIGNS: 24 HR MIN & MAX LAST   Temp  Min: 97.8 °F (36.6 °C)  Max: 98 °F (36.7 °C)  97.8 °F (36.6 °C)   BP  Min: 99/73  Max: 172/104  115/77    Pulse  Min: 85  Max: 117  86    Resp  Min: 15  Max: 20  18    SpO2  Min: 95 %  Max: 100 %  99 %      HT: 5' 2" (157.5 cm)  WT: 89.4 kg (197 lb)  BMI: 36     Intake/output:  Intake/Output - Last 3 Shifts         06/19 0700 06/20 0659 06/20 0700 06/21 0659 06/21 0700 06/22 0659    IV Piggyback  2500     Total Intake(mL/kg)  2500 (28)     Urine (mL/kg/hr)  400     Emesis/NG output  100     Blood  50     Total Output  550     Net  +1950                    Intake/Output Summary (Last 24 hours) at 6/21/2023 0703  Last data filed at 6/20/2023 1928  Gross per 24 hour   Intake 2500 ml   Output 550 ml   Net 1950 ml           Lines/drains/airway:       Peripheral IV - Single Lumen 06/20/23 1128 20 G Left Antecubital (Active)   Site Assessment Clean;Dry;Intact 06/21/23 0400   Extremity Assessment Distal to IV No abnormal discoloration 06/20/23 2015   Line Status " Infusing 06/21/23 0400   Dressing Status Clean;Dry;Intact 06/21/23 0400   Dressing Intervention Integrity maintained 06/21/23 0400   Number of days: 0       Physical examination:  Gen: NAD, AAOx3, answering questions appropriately  HEENT: normocephalic, atraumatic   CV: RRR  Resp: NWOB  Abd: soft, attp following surgery, incisions c/d/I   Ext: moving all extremities spontaneously and purposefully  Neuro: CN II-XII grossly intact      Labs:  Renal:  Recent Labs     06/19/23 2002 06/20/23 1018 06/21/23  0600   BUN 6.4* 6.7* 5.2*   CREATININE 0.85 0.87 0.82     Recent Labs     06/20/23  1418   LACTIC 1.6     FENGI:  Recent Labs     06/19/23 2002 06/20/23  1018 06/21/23  0600    136 138   K 3.5 3.7 4.0   CO2 22 22 23   CALCIUM 10.2 9.7 8.3*   ALBUMIN 4.3 4.4 3.4*   BILITOT 0.8 0.9 0.7   AST 18 16 95*   ALKPHOS 80 90 69   ALT 15 15 83*     Heme:  Recent Labs     06/19/23 2002 06/20/23  1019 06/21/23  0600   HGB 12.9 13.1 10.9*   HCT 38.2 39.2 33.8*    446* 371   INR 0.96*  --   --      ID:  Recent Labs     06/19/23 2002 06/20/23  1019 06/21/23  0600   WBC 8.66 13.83* 11.10     CBG:  Recent Labs     06/19/23 2002 06/20/23  1018 06/21/23  0600   GLUCOSE 91 121* 119*      Cardiovascular:  Recent Labs   Lab 06/20/23  1018   TROPONINI <0.010     ABG:  No results for input(s): PH, PO2, PCO2, HCO3, BE in the last 168 hours.   I have reviewed all pertinent lab results within the past 24 hours.    Imaging:  US Abdomen Limited   Final Result      Cholelithiasis with minimal gallbladder wall thickening and positive sonographic Corbin sign.  Cholecystitis is possible.      Hepatic steatosis.         Electronically signed by: Marshal Tariq   Date:    06/20/2023   Time:    12:37         I have reviewed all pertinent imaging results/findings within the past 24 hours.    Micro/Path/Other:  Microbiology Results (last 7 days)       Procedure Component Value Units Date/Time    Blood culture #2 **CANNOT BE ORDERED STAT**  [[id number]] Collected: 06/20/23 1418    Order Status: Resulted Specimen: Blood Updated: 06/20/23 1426    Blood culture #1 **CANNOT BE ORDERED STAT** [[id number]] Collected: 06/20/23 1418    Order Status: Resulted Specimen: Blood Updated: 06/20/23 1426           Specimen (168h ago, onward)       Start     Ordered    06/20/23 1636  Specimen to Pathology  RELEASE UPON ORDERING        References:    Click here for ordering Quick Tip   Question:  Release to patient  Answer:  Immediate    06/20/23 1636                     Assessment & Plan:   42 year old female with acute cholecystitis s/p laparoscopic cholecystectomy     - vitals, labs and images reviewed   - Advance to regular diet today   - Multimodal pain meds   - IS, ambulation, OOBTC   - Tbili normal, mild elevation in AST/ALT - to be expected today   - lovenox for dvt ppx     Tona Doshi MD   [employer] General Surgery PGY4

## 2023-06-21 NOTE — PLAN OF CARE
Problem: Adult Inpatient Plan of Care  Goal: Plan of Care Review  6/21/2023 0156 by Ena Beckman LPN  Outcome: Ongoing, Progressing  6/21/2023 0154 by Ena Beckman LPN  Outcome: Ongoing, Progressing  Goal: Absence of Hospital-Acquired Illness or Injury  6/21/2023 0156 by Ena Beckman LPN  Outcome: Ongoing, Progressing  6/21/2023 0154 by Ena Beckman LPN  Outcome: Ongoing, Progressing  Goal: Optimal Comfort and Wellbeing  6/21/2023 0156 by Ena Beckman LPN  Outcome: Ongoing, Progressing  6/21/2023 0154 by Ena Beckman LPN  Outcome: Ongoing, Progressing     Problem: Pain Acute  Goal: Acceptable Pain Control and Functional Ability  Outcome: Ongoing, Progressing     Problem: Bleeding (Cholecystectomy)  Goal: Absence of Bleeding  Outcome: Ongoing, Progressing     Problem: Bowel Motility Impaired (Cholecystectomy)  Goal: Effective Bowel Elimination  Outcome: Ongoing, Progressing     Problem: Infection (Cholecystectomy)  Goal: Absence of Infection Signs and Symptoms  Outcome: Ongoing, Progressing     Problem: Pain (Cholecystectomy)  Goal: Acceptable Pain Control  Outcome: Ongoing, Progressing     Problem: Postoperative Nausea and Vomiting (Cholecystectomy)  Goal: Nausea and Vomiting Relief  Outcome: Ongoing, Progressing

## 2023-06-25 LAB
BACTERIA BLD CULT: NORMAL
BACTERIA BLD CULT: NORMAL

## 2023-06-27 RX ORDER — BUSPIRONE HYDROCHLORIDE 15 MG/1
TABLET ORAL
Qty: 90 TABLET | Refills: 1 | Status: SHIPPED | OUTPATIENT
Start: 2023-06-27

## 2023-06-29 ENCOUNTER — HOSPITAL ENCOUNTER (EMERGENCY)
Facility: HOSPITAL | Age: 43
Discharge: HOME | End: 2023-06-29
Attending: STUDENT IN AN ORGANIZED HEALTH CARE EDUCATION/TRAINING PROGRAM | Admitting: NURSE PRACTITIONER
Payer: COMMERCIAL

## 2023-06-29 VITALS
SYSTOLIC BLOOD PRESSURE: 160 MMHG | RESPIRATION RATE: 18 BRPM | HEART RATE: 89 BPM | WEIGHT: 194 LBS | DIASTOLIC BLOOD PRESSURE: 100 MMHG | HEIGHT: 62 IN | BODY MASS INDEX: 35.7 KG/M2 | TEMPERATURE: 98 F | OXYGEN SATURATION: 100 %

## 2023-06-29 DIAGNOSIS — Z51.89 VISIT FOR WOUND CHECK: Primary | ICD-10-CM

## 2023-06-29 LAB
ALBUMIN SERPL-MCNC: 3.9 G/DL (ref 3.5–5)
ALBUMIN/GLOB SERPL: 1 RATIO (ref 1.1–2)
ALP SERPL-CCNC: 87 UNIT/L (ref 40–150)
ALT SERPL-CCNC: 19 UNIT/L (ref 0–55)
AST SERPL-CCNC: 15 UNIT/L (ref 5–34)
BASOPHILS # BLD AUTO: 0.04 X10(3)/MCL
BASOPHILS NFR BLD AUTO: 0.4 %
BILIRUBIN DIRECT+TOT PNL SERPL-MCNC: 0.3 MG/DL
BUN SERPL-MCNC: 11.7 MG/DL (ref 7–18.7)
CALCIUM SERPL-MCNC: 9.4 MG/DL (ref 8.4–10.2)
CHLORIDE SERPL-SCNC: 106 MMOL/L (ref 98–107)
CO2 SERPL-SCNC: 24 MMOL/L (ref 22–29)
CREAT SERPL-MCNC: 0.79 MG/DL (ref 0.55–1.02)
EOSINOPHIL # BLD AUTO: 0.24 X10(3)/MCL (ref 0–0.9)
EOSINOPHIL NFR BLD AUTO: 2.7 %
ERYTHROCYTE [DISTWIDTH] IN BLOOD BY AUTOMATED COUNT: 13.3 % (ref 11.5–17)
GFR SERPLBLD CREATININE-BSD FMLA CKD-EPI: >60 MLS/MIN/1.73/M2
GLOBULIN SER-MCNC: 4 GM/DL (ref 2.4–3.5)
GLUCOSE SERPL-MCNC: 81 MG/DL (ref 74–100)
HCT VFR BLD AUTO: 32.2 % (ref 37–47)
HGB BLD-MCNC: 10.6 G/DL (ref 12–16)
IMM GRANULOCYTES # BLD AUTO: 0.02 X10(3)/MCL (ref 0–0.04)
IMM GRANULOCYTES NFR BLD AUTO: 0.2 %
LACTATE SERPL-SCNC: 1.5 MMOL/L (ref 0.5–2.2)
LYMPHOCYTES # BLD AUTO: 3.28 X10(3)/MCL (ref 0.6–4.6)
LYMPHOCYTES NFR BLD AUTO: 36.9 %
MCH RBC QN AUTO: 30.3 PG (ref 27–31)
MCHC RBC AUTO-ENTMCNC: 32.9 G/DL (ref 33–36)
MCV RBC AUTO: 92 FL (ref 80–94)
MONOCYTES # BLD AUTO: 0.42 X10(3)/MCL (ref 0.1–1.3)
MONOCYTES NFR BLD AUTO: 4.7 %
NEUTROPHILS # BLD AUTO: 4.9 X10(3)/MCL (ref 2.1–9.2)
NEUTROPHILS NFR BLD AUTO: 55.1 %
NRBC BLD AUTO-RTO: 0 %
PLATELET # BLD AUTO: 434 X10(3)/MCL (ref 130–400)
PMV BLD AUTO: 8.5 FL (ref 7.4–10.4)
POTASSIUM SERPL-SCNC: 3.5 MMOL/L (ref 3.5–5.1)
PROT SERPL-MCNC: 7.9 GM/DL (ref 6.4–8.3)
PSYCHE PATHOLOGY RESULT: NORMAL
RBC # BLD AUTO: 3.5 X10(6)/MCL (ref 4.2–5.4)
SODIUM SERPL-SCNC: 138 MMOL/L (ref 136–145)
WBC # SPEC AUTO: 8.9 X10(3)/MCL (ref 4.5–11.5)

## 2023-06-29 PROCEDURE — 85025 COMPLETE CBC W/AUTO DIFF WBC: CPT | Performed by: PHYSICIAN ASSISTANT

## 2023-06-29 PROCEDURE — 80053 COMPREHEN METABOLIC PANEL: CPT | Performed by: PHYSICIAN ASSISTANT

## 2023-06-29 PROCEDURE — 83605 ASSAY OF LACTIC ACID: CPT | Performed by: PHYSICIAN ASSISTANT

## 2023-06-29 PROCEDURE — 99283 EMERGENCY DEPT VISIT LOW MDM: CPT

## 2023-06-30 NOTE — ED PROVIDER NOTES
Encounter Date: 2023       History     Chief Complaint   Patient presents with    Wound Check     Patient had choley on  Dr Astorga, she states drainage from incision site     See MDM    The history is provided by the patient. No  was used.   Review of patient's allergies indicates:  No Known Allergies  Past Medical History:   Diagnosis Date    Anxiety and depression 3/21/2023    Essential (primary) hypertension     Mixed hyperlipidemia 3/21/2023    Pre-diabetes     Seasonal allergies 2023    Vitamin D deficiency      Past Surgical History:   Procedure Laterality Date     SECTION  1996     SECTION  2006    EYE SURGERY  2000    LAPAROSCOPIC CHOLECYSTECTOMY N/A 2023    Procedure: CHOLECYSTECTOMY, LAPAROSCOPIC;  Surgeon: Robert Astorga MD;  Location: [facility];  Service: General;  Laterality: N/A;     Family History   Problem Relation Age of Onset    Hypertension Mother     Diabetes Brother      Social History     Tobacco Use    Smoking status: Never    Smokeless tobacco: Never   Substance Use Topics    Alcohol use: Not Currently    Drug use: Never     Review of Systems   Constitutional:  Negative for fever.   Respiratory:  Negative for cough and shortness of breath.    Cardiovascular:  Negative for chest pain.   Gastrointestinal:  Negative for abdominal pain.   Genitourinary:  Negative for difficulty urinating and dysuria.   Musculoskeletal:  Negative for gait problem.   Skin:  Negative for color change.   Neurological:  Negative for dizziness, speech difficulty and headaches.   Psychiatric/Behavioral:  Negative for hallucinations and suicidal ideas.    All other systems reviewed and are negative.    Physical Exam     Initial Vitals [23]   BP Pulse Resp Temp SpO2   (!) 158/105 91 18 97.9 °F (36.6 °C) 98 %      MAP       --         Physical Exam    Nursing note and vitals reviewed.  Constitutional: She appears well-developed and  well-nourished.   HENT:   Head: Normocephalic.   Eyes: EOM are normal.   Neck:   Normal range of motion.  Cardiovascular:  Normal rate, regular rhythm, normal heart sounds and intact distal pulses.           Pulmonary/Chest: Breath sounds normal. No respiratory distress.   Abdominal: Abdomen is soft. Bowel sounds are normal. There is no abdominal tenderness.   Musculoskeletal:         General: Normal range of motion.      Cervical back: Normal range of motion.     Neurological: She is alert and oriented to person, place, and time. She has normal strength.   Skin: Skin is warm and dry.   To the upper mid abdomen does not show any purulent drainage   Psychiatric: She has a normal mood and affect. Her behavior is normal. Judgment and thought content normal.       ED Course   Procedures  Labs Reviewed   COMPREHENSIVE METABOLIC PANEL - Abnormal; Notable for the following components:       Result Value    Globulin 4.0 (*)     Albumin/Globulin Ratio 1.0 (*)     All other components within normal limits   CBC WITH DIFFERENTIAL - Abnormal; Notable for the following components:    RBC 3.50 (*)     Hgb 10.6 (*)     Hct 32.2 (*)     MCHC 32.9 (*)     Platelet 434 (*)     All other components within normal limits   LACTIC ACID, PLASMA - Normal   CBC W/ AUTO DIFFERENTIAL    Narrative:     The following orders were created for panel order CBC auto differential.  Procedure                               Abnormality         Status                     ---------                               -----------         ------                     CBC with Differential[[id number]]        Abnormal            Final result                 Please view results for these tests on the individual orders.          Imaging Results    None          Medications - No data to display  Medical Decision Making:   Initial Assessment:   Historian:  Patient.  Patient is a 42-year-old female  that presents with about her midline at upper abdomen trocar site that has  been present today. Associated symptoms denies pain. Surrounding information is recent cholecystectomy. Exacerbated by nothing. Relieved by nothing. Patient treatment prior to arrival none. Risk factors include nothing. Other history pertaining to this complaint nothing.   Assessment:  See physical exam.    Differential Diagnosis:   Infected wound, intra-abdominal infection, healing wound  ED Management:  History was obtained.  Physical was performed.  Lab studies unremarkable.  Incision to the mid upper abdomen shows no signs of infection or drainage.  Did speak to Dr. Doshi with surgery and they will follow her up in clinic.  No social determinants that affect healthcare were noted.           ED Course as of 06/29/23 2321   Thu Jun 29, 2023   2251 Paged surgery [CL]      ED Course User Index  [CL] URIEL Anderson                 Clinical Impression:   Final diagnoses:  [Z51.89] Visit for wound check (Primary)        ED Disposition Condition    Discharge Stable          ED Prescriptions    None       Follow-up Information       Follow up With Specialties Details Why Contact Info    Robert Astorga MD General Surgery Call in 3 days ed follow up [address]   [address]  [facility] [address]  [phone]               URIEL Anderson  06/29/23 2321

## 2023-06-30 NOTE — FIRST PROVIDER EVALUATION
"Medical screening examination initiated.  I have conducted a focused provider triage encounter, findings are as follows:    Brief history of present illness:  41 yo female presents to ED for evaluation of drainage from incision site with foul smelling odor. Patient reports cholecystectomy on 6/20/23. Hx of HTN, did not take her BP.     Vitals:    06/29/23 1953   BP: (!) 158/105   Pulse: 91   Resp: 18   Temp: 97.9 °F (36.6 °C)   TempSrc: Oral   SpO2: 98%   Weight: 88 kg (194 lb)   Height: 5' 2" (1.575 m)       Pertinent physical exam:  Patient is awake and alert and oriented.  Ambulatory to triage.  In no acute distress.      Brief workup plan:  labs, evaluation    Preliminary workup initiated; this workup will be continued and followed by the physician or advanced practice provider that is assigned to the patient when roomed.  "

## 2023-07-07 ENCOUNTER — OFFICE VISIT (OUTPATIENT)
Dept: FAMILY MEDICINE | Facility: CLINIC | Age: 43
End: 2023-07-07
Payer: COMMERCIAL

## 2023-07-07 ENCOUNTER — PATIENT OUTREACH (OUTPATIENT)
Dept: ADMINISTRATIVE | Facility: HOSPITAL | Age: 43
End: 2023-07-07
Payer: COMMERCIAL

## 2023-07-07 VITALS
DIASTOLIC BLOOD PRESSURE: 80 MMHG | HEIGHT: 62 IN | BODY MASS INDEX: 35.63 KG/M2 | OXYGEN SATURATION: 100 % | HEART RATE: 81 BPM | WEIGHT: 193.63 LBS | TEMPERATURE: 98 F | SYSTOLIC BLOOD PRESSURE: 126 MMHG

## 2023-07-07 DIAGNOSIS — I10 ESSENTIAL (PRIMARY) HYPERTENSION: ICD-10-CM

## 2023-07-07 DIAGNOSIS — Z09 HOSPITAL DISCHARGE FOLLOW-UP: Primary | ICD-10-CM

## 2023-07-07 DIAGNOSIS — Z00.00 WELLNESS EXAMINATION: ICD-10-CM

## 2023-07-07 PROCEDURE — 3074F PR MOST RECENT SYSTOLIC BLOOD PRESSURE < 130 MM HG: ICD-10-PCS | Mod: CPTII,,, | Performed by: FAMILY MEDICINE

## 2023-07-07 PROCEDURE — 1160F RVW MEDS BY RX/DR IN RCRD: CPT | Mod: CPTII,,, | Performed by: FAMILY MEDICINE

## 2023-07-07 PROCEDURE — 3079F DIAST BP 80-89 MM HG: CPT | Mod: CPTII,,, | Performed by: FAMILY MEDICINE

## 2023-07-07 PROCEDURE — 3044F PR MOST RECENT HEMOGLOBIN A1C LEVEL <7.0%: ICD-10-PCS | Mod: CPTII,,, | Performed by: FAMILY MEDICINE

## 2023-07-07 PROCEDURE — 1160F PR REVIEW ALL MEDS BY PRESCRIBER/CLIN PHARMACIST DOCUMENTED: ICD-10-PCS | Mod: CPTII,,, | Performed by: FAMILY MEDICINE

## 2023-07-07 PROCEDURE — 3044F HG A1C LEVEL LT 7.0%: CPT | Mod: CPTII,,, | Performed by: FAMILY MEDICINE

## 2023-07-07 PROCEDURE — 3079F PR MOST RECENT DIASTOLIC BLOOD PRESSURE 80-89 MM HG: ICD-10-PCS | Mod: CPTII,,, | Performed by: FAMILY MEDICINE

## 2023-07-07 PROCEDURE — 3008F BODY MASS INDEX DOCD: CPT | Mod: CPTII,,, | Performed by: FAMILY MEDICINE

## 2023-07-07 PROCEDURE — 3008F PR BODY MASS INDEX (BMI) DOCUMENTED: ICD-10-PCS | Mod: CPTII,,, | Performed by: FAMILY MEDICINE

## 2023-07-07 PROCEDURE — 1159F PR MEDICATION LIST DOCUMENTED IN MEDICAL RECORD: ICD-10-PCS | Mod: CPTII,,, | Performed by: FAMILY MEDICINE

## 2023-07-07 PROCEDURE — 99213 PR OFFICE/OUTPT VISIT, EST, LEVL III, 20-29 MIN: ICD-10-PCS | Mod: ,,, | Performed by: FAMILY MEDICINE

## 2023-07-07 PROCEDURE — 1159F MED LIST DOCD IN RCRD: CPT | Mod: CPTII,,, | Performed by: FAMILY MEDICINE

## 2023-07-07 PROCEDURE — 99213 OFFICE O/P EST LOW 20 MIN: CPT | Mod: ,,, | Performed by: FAMILY MEDICINE

## 2023-07-07 PROCEDURE — 3074F SYST BP LT 130 MM HG: CPT | Mod: CPTII,,, | Performed by: FAMILY MEDICINE

## 2023-07-07 RX ORDER — PHENTERMINE HYDROCHLORIDE 37.5 MG/1
TABLET ORAL
COMMUNITY
Start: 2023-06-13 | End: 2023-07-07

## 2023-07-07 NOTE — PROGRESS NOTES
Patient ID: [id number]     Chief Complaint: Follow-up (Hospital follow up)      HPI:     Liz Magaña is a 42 y.o. female here today for a follow up.   1) Patient underwent-gallbladder surgery-initially presented to the emergency room with abdominal pain-was sent home from 1 emergency room-went back to another emergency room because of continuing pain-after evaluation underwent laparoscopic cholecystectomy.  A few days after discharge patient became concerned because surgical site had pus drainage-with back to the emergency room given medication-has been doing well since discharge.  Experiencing some loose bowel movements/no abdominal pain/no significant problems with appetite/no changes in energy level.       Past Medical History:   Diagnosis Date    Anxiety and depression 3/21/2023    Essential (primary) hypertension     Mixed hyperlipidemia 3/21/2023    Pre-diabetes     Seasonal allergies 2023    Vitamin D deficiency         Past Surgical History:   Procedure Laterality Date     SECTION  1996     SECTION  2006    EYE SURGERY  2000    LAPAROSCOPIC CHOLECYSTECTOMY N/A 2023    Procedure: CHOLECYSTECTOMY, LAPAROSCOPIC;  Surgeon: Robert Astorga MD;  Location: [facility];  Service: General;  Laterality: N/A;        Social History     Tobacco Use    Smoking status: Never    Smokeless tobacco: Never   Substance Use Topics    Alcohol use: Not Currently    Drug use: Never        Social History     Socioeconomic History    Marital status: Single    Number of children: 2        Current Outpatient Medications   Medication Instructions    amLODIPine (NORVASC) 5 MG tablet TAKE 1 TABLET BY MOUTH EVERY DAY IN THE MORNING    busPIRone (BUSPAR) 15 MG tablet TAKE 1 TABLET BY MOUTH EVERY DAY    ferrous sulfate (FEOSOL) 325 mg, Oral, Daily PRN    loratadine (CLARITIN) 10 mg, Oral       Review of patient's allergies indicates:  No Known Allergies     Patient Care Team:  Denita Gonzalez MD as  "PCP - General (Family Medicine)       Subjective:     Review of Systems    12 point review of systems conducted, negative except as stated in the history of present illness. See HPI for details.      Objective:     Visit Vitals  /80 (BP Location: Left arm, Patient Position: Sitting)   Pulse 81   Temp 97.6 °F (36.4 °C)   Ht 5' 2" (1.575 m)   Wt 87.8 kg (193 lb 9.6 oz)   LMP 06/23/2023 (Exact Date)   SpO2 100%   BMI 35.41 kg/m²       Physical Exam    General: Alert and oriented, No acute distress.  Head: Normocephalic, Atraumatic.  Eye: Pupils are equal, round and reactive to light, Extraocular movements are intact, Sclera non-icteric.  Ears/Nose/Throat: Normal, Mucosa moist,Clear.  Neck/Thyroid: Supple, Non-tender  Respiratory: Clear to auscultation bilaterally  Cardiovascular: Regular rate and rhythm  Gastrointestinal: Soft, Non-tender, Non-distended, Normal bowel sounds-surgical site no signs of acute infection  Musculoskeletal: Normal range of motion.  Integumentary: Warm, Dry, Intact  Extremities: No clubbing, cyanosis or edema  Neurologic: No focal deficits, Cranial Nerves II-XII are grossly intact  Psychiatric: Normal interaction      Assessment:       ICD-10-CM ICD-9-CM   1. Hospital discharge follow-up  Z09 V67.59   2. Body mass index (BMI) 35.0-35.9, adult  Z68.35 V85.35   3. Essential (primary) hypertension  I10 401.9   4. Wellness examination  Z00.00 V70.0        Plan:     1. Hospital discharge follow-up  Patient was hospitalized for abdominal pain underwent laparoscopic cholecystectomy-since discharge patient has been doing well.  Discharge instructions reviewed-medications reviewed-changes updated to patient's chart.     2. Body mass index (BMI) 35.0-35.9, adult  Diet Modifications/ Limit Calories to 1500 a day- Every Meal should have Fiber and Protein/ Chew Each Bite 10-15 times/ Drink 64 oz of water daily- 17 oz of water 30 minutes before every meal/Limit processed food- cook meals- limit eating " out/fast food to less than once a month-referral to dietitian    - Ambulatory referral/consult to Nutrition Services; Future    3. Essential (primary) hypertension  Patient has been taking medication. Blood pressure goal of less than 130/80-blood pressure is stable. Continue to encourage diet and activity modifications. Including stress management. Pathophysiology/treatment/dangers discussed.     4. Wellness examination  Patient given lab orders to be completed before wellness visit.   - CBC Auto Differential; Future  - Comprehensive Metabolic Panel; Future  - Lipid Panel; Future  - TSH; Future  - Hemoglobin A1C; Future  - Urinalysis; Future  - Hepatitis C Antibody; Future  - HIV 1/2 Ag/Ab (4th Gen); Future  - Urinalysis         Follow up if symptoms worsen or fail to improve. In addition to their scheduled follow up, the patient has also been instructed to follow up on as needed basis.     Future Appointments   Date Time Provider Department Center   9/26/2023 10:30 AM MD TRISH Mckay   3/26/2024 10:30 AM MD TRISH Mckay MD

## 2023-07-07 NOTE — LETTER
"  This communication is flagged as high priority.        AUTHORIZATION FOR RELEASE OF   CONFIDENTIAL INFORMATION    Dear MIRIAM,    We are seeing Liz Magaña, date of birth 1980, in the clinic at [facility]. Denita Gonzalez MD is the patient's PCP. Liz Magaña has an outstanding lab/procedure at the time we reviewed her chart. In order to help keep her health information updated, she has authorized us to request the following medical record(s):        (  xx)  MAMMOGRAM                                      (  )  COLONOSCOPY      (  )  PAP SMEAR                                          (  )  OUTSIDE LAB RESULTS     (  )  DEXA SCAN                                          (  )  EYE EXAM            (  )  FOOT EXAM                                          (  )  ENTIRE RECORD     (  )  OUTSIDE IMMUNIZATIONS                 (  )  _______________         Please fax records to [facility], Indira Gautam, MD,  [phone]       If you have any questions, please contact Kriss Yanez (Connie)Care Coordinator @ [phone]     Patient Name: Liz Magaña  : 1980  Patient Phone #: [phone]     "

## 2023-07-10 ENCOUNTER — PATIENT OUTREACH (OUTPATIENT)
Dept: ADMINISTRATIVE | Facility: HOSPITAL | Age: 43
End: 2023-07-10
Payer: COMMERCIAL

## 2023-07-10 NOTE — PROGRESS NOTES
The following record(s)  below were uploaded for Health Maintenance .    MAMMOGRAM SCREENING    06/13/23

## 2023-07-11 ENCOUNTER — DOCUMENTATION ONLY (OUTPATIENT)
Dept: SURGERY | Facility: CLINIC | Age: 43
End: 2023-07-11
Payer: COMMERCIAL

## 2023-07-11 ENCOUNTER — TELEPHONE (OUTPATIENT)
Dept: SURGERY | Facility: HOSPITAL | Age: 43
End: 2023-07-11
Payer: COMMERCIAL

## 2023-07-11 NOTE — TELEPHONE ENCOUNTER
Televisit Note     Date of surgery: 6/20/23  Procedure: laparoscopic cholecystectomy   Surgeon: Dr. Gauri Magaña is doing well. Has no complaints. Reports incision sites have healed up. Tolerating a diet. Having bowel movements. Denies fever, chills, nausea, vomiting, and changes in bowel movement. Pathology reviewed. Pathology report (see below) was discussed with the patient. Post-op care discussed. All questions were answered. Recommended to call our clinic as needed with any concerns.No indication to call patient back. Follow up with primary care provider. Return precautions provided.    Pathology:  GALLBLADDER, CHOLECYSTECTOMY:   CHOLELITHIASIS AND CHRONIC CHOLECYSTITIS.     Micro:   None     Geoffrey Souza MD  [facility] General Surgery HO-1  4:03 PM

## 2023-08-21 DIAGNOSIS — J30.2 SEASONAL ALLERGIES: Primary | ICD-10-CM

## 2023-08-22 RX ORDER — LORATADINE 10 MG/1
10 TABLET ORAL
Qty: 90 TABLET | Refills: 1 | Status: SHIPPED | OUTPATIENT
Start: 2023-08-22

## 2024-03-06 DIAGNOSIS — I10 ESSENTIAL (PRIMARY) HYPERTENSION: Primary | ICD-10-CM

## 2024-03-06 RX ORDER — AMLODIPINE BESYLATE 5 MG/1
TABLET ORAL
Qty: 30 TABLET | Refills: 6 | Status: SHIPPED | OUTPATIENT
Start: 2024-03-06

## 2024-03-22 NOTE — PROGRESS NOTES
Patient ID: [id number]     Chief Complaint: Annual Exam        HPI:     Liz Magaña is a 43 y.o. female here today for an annual wellness. She did not have bloodwork completed prior to visit as ordered but will do so in the near future.   Has been very busy with work-continuing to try to make diet and lifestyle modifications.  1) Hypertension: Patient has been taking medicine daily. No symptoms associated with increased BP such as headache/ visual changes/ dizziness/ chest pain.   2) Hyperlipidemia: Patient is continuing to make lifestyle modifications.  3) Depression/Anxiety: Patient has been doing well on medication. Trying to incorporate lifestyle changes including exercise.    Past Medical History:   Diagnosis Date    Anxiety and depression 2023    Essential (primary) hypertension     Mixed hyperlipidemia 2023    Seasonal allergies 2023        Past Surgical History:   Procedure Laterality Date     SECTION  1996     SECTION  2006    EYE SURGERY  2000    LAPAROSCOPIC CHOLECYSTECTOMY N/A 2023    Procedure: CHOLECYSTECTOMY, LAPAROSCOPIC;  Surgeon: Robert Astorga MD;  Location: [facility];  Service: General;  Laterality: N/A;        Social History     Tobacco Use    Smoking status: Never    Smokeless tobacco: Never   Substance Use Topics    Alcohol use: Not Currently    Drug use: Never        Social History     Socioeconomic History    Marital status: Single    Number of children: 2        Current Outpatient Medications   Medication Instructions    amLODIPine (NORVASC) 5 MG tablet TAKE 1 TABLET BY MOUTH EVERY DAY IN THE MORNING    busPIRone (BUSPAR) 15 MG tablet TAKE 1 TABLET BY MOUTH EVERY DAY    ferrous sulfate (FEOSOL) 325 mg, Oral, Daily PRN    loratadine (CLARITIN) 10 mg, Oral       Review of patient's allergies indicates:  No Known Allergies     Patient Care Team:  Denita Gonzalez MD as PCP - General (Family Medicine)  Ariel Oscar MD as Consulting  "Physician (Obstetrics and Gynecology)     Subjective:     Review of Systems    12 point review of systems conducted, negative except as stated in the history of present illness. See HPI for details.      Objective:     Visit Vitals  /86   Pulse 102   Temp 97.6 °F (36.4 °C)   Ht 5' 2" (1.575 m)   Wt 91.2 kg (201 lb)   LMP 03/08/2024 (Approximate)   SpO2 98%   BMI 36.76 kg/m²       Physical Exam    General: Alert and oriented, No acute distress.  Head: Normocephalic, Atraumatic.  Eye: Pupils are equal, round and reactive to light, Extraocular movements are intact, Sclera non-icteric.  Ears/Nose/Throat: Normal, Mucosa moist,Clear.  Neck/Thyroid: Supple, Non-tender,  Swelling noted.  Respiratory: Clear to auscultation bilaterally; No wheezes, rales or rhonchi,Non-labored respirations, Symmetrical chest wall expansion.  Cardiovascular: Regular rate and rhythm, S1/S2 normal, No murmurs, rubs or gallops.  Gastrointestinal: Soft, Non-tender, Non-distended, Normal bowel sounds, No palpable organomegaly.  Musculoskeletal: Normal range of motion.  Integumentary: Warm, Dry, Intact, No suspicious lesions or rashes.  Extremities: No clubbing, cyanosis or edema  Neurologic: No focal deficits, Cranial Nerves II-XII are grossly intact, Motor strength normal upper and lower extremities, Sensory exam intact.  Psychiatric: Normal interaction, Coherent speech, Euthymic mood, Appropriate affect       Assessment:       ICD-10-CM ICD-9-CM   1. Wellness examination  Z00.00 V70.0   2. Essential (primary) hypertension  I10 401.9   3. Mixed hyperlipidemia  E78.2 272.2   4. Anxiety and depression  F41.9 300.00    F32.A 311   5. Seasonal allergies  J30.2 477.9   6. Localized swelling, mass and lump, neck  R22.1 784.2        Plan:       Cervical Cancer Screening - Pap Up to date    Breast Cancer Screening - Mammogram Up to date     Colon Cancer Screening - Colon cancer screening to start at age 45     Eye Exam - Recommend " "annually.    Dental Exam - Recommend biannual exams.     Vaccinations - Immunization guidelines reviewed with the patient.  Encourage patient to prevent disease through immunizations.    Immunization History   Administered Date(s) Administered    DTP 1980, 02/19/1981, 04/30/1981, 02/18/1982, 01/16/1986    MMR 02/18/1982, 04/28/1998    OPV 1980, 02/19/1981, 04/30/1981, 02/18/1982    Td (ADULT) 04/28/1998    Tdap 08/17/2011      1. Wellness examination  Wellness: Five Rules Reviewed: Water/Nutrition-Real Food, Limit Fast Food/ Exercise 20-30 minutes most days of the week/ Mental health- "Is your work a calling or paycheck" Define 'What is your purpose-what matters to you' Stress- Is an Individual Response- Therefore Can be Controlled by the Individual. Meditation/ Immunizations/Multivitamin use-Vitamin D3/ Screenings   - CBC Auto Differential; Future  - Comprehensive Metabolic Panel; Future  - Lipid Panel; Future  - TSH; Future  - Hemoglobin A1C; Future  - Urinalysis; Future  - Mammo Digital Screening Bilat; Future    2. Essential (primary) hypertension  Patient has been taking medication. Blood pressure goal of less than 130/80-blood pressure is stable. Continue to encourage diet and activity modifications. Including stress management. Pathophysiology/treatment/dangers discussed.    3. Mixed hyperlipidemia  Check studies management based upon results.  Pathophysiology/treatment/dangers discussed.    4. Anxiety and depression  Depression/Anxiety: Patient is doing well on medication for depression/anxiety- continue to encourage lifestyle modifications including 20-30 minutes exercise daily/ meditation/ empower self through compassion.     5. Seasonal allergies  Patient is currently stable.  No acute modifications recommended.  Continue with current treatment-use of antihistamine has not needed.    6. Localized swelling, mass and lump, neck  Check studies management based upon results.  " Pathophysiology/treatment/dangers discussed.    - US Soft Tissue Head Neck; Future        The patient's Health Maintenance was reviewed and the following appears to be due at this time:   Health Maintenance Due   Topic Date Due    Hepatitis C Screening  Never done    COVID-19 Vaccine (1) Never done    HIV Screening  Never done    TETANUS VACCINE  08/17/2021    Influenza Vaccine (1) Never done    Mammogram  06/13/2024         Follow up in about 6 months (around 9/26/2024). In addition to their scheduled follow up, the patient has also been instructed to follow up on as needed basis.     Future Appointments   Date Time Provider Department Center   9/26/2024  8:30 AM Denita Gonzalez MD [facility]        Denita Gonzalez MD

## 2024-03-26 ENCOUNTER — OFFICE VISIT (OUTPATIENT)
Dept: FAMILY MEDICINE | Facility: CLINIC | Age: 44
End: 2024-03-26
Payer: MEDICAID

## 2024-03-26 ENCOUNTER — TELEPHONE (OUTPATIENT)
Dept: FAMILY MEDICINE | Facility: CLINIC | Age: 44
End: 2024-03-26

## 2024-03-26 VITALS
WEIGHT: 201 LBS | SYSTOLIC BLOOD PRESSURE: 130 MMHG | DIASTOLIC BLOOD PRESSURE: 86 MMHG | HEART RATE: 102 BPM | HEIGHT: 62 IN | OXYGEN SATURATION: 98 % | TEMPERATURE: 98 F | BODY MASS INDEX: 36.99 KG/M2

## 2024-03-26 DIAGNOSIS — F32.A ANXIETY AND DEPRESSION: ICD-10-CM

## 2024-03-26 DIAGNOSIS — Z00.00 WELLNESS EXAMINATION: Primary | ICD-10-CM

## 2024-03-26 DIAGNOSIS — F41.9 ANXIETY AND DEPRESSION: ICD-10-CM

## 2024-03-26 DIAGNOSIS — R22.1 LOCALIZED SWELLING, MASS AND LUMP, NECK: ICD-10-CM

## 2024-03-26 DIAGNOSIS — E78.2 MIXED HYPERLIPIDEMIA: ICD-10-CM

## 2024-03-26 DIAGNOSIS — J30.2 SEASONAL ALLERGIES: ICD-10-CM

## 2024-03-26 DIAGNOSIS — I10 ESSENTIAL (PRIMARY) HYPERTENSION: ICD-10-CM

## 2024-03-26 PROCEDURE — 99396 PREV VISIT EST AGE 40-64: CPT | Mod: ,,, | Performed by: FAMILY MEDICINE

## 2024-03-26 PROCEDURE — 3075F SYST BP GE 130 - 139MM HG: CPT | Mod: CPTII,,, | Performed by: FAMILY MEDICINE

## 2024-03-26 PROCEDURE — 1160F RVW MEDS BY RX/DR IN RCRD: CPT | Mod: CPTII,,, | Performed by: FAMILY MEDICINE

## 2024-03-26 PROCEDURE — 3008F BODY MASS INDEX DOCD: CPT | Mod: CPTII,,, | Performed by: FAMILY MEDICINE

## 2024-03-26 PROCEDURE — 1159F MED LIST DOCD IN RCRD: CPT | Mod: CPTII,,, | Performed by: FAMILY MEDICINE

## 2024-03-26 PROCEDURE — 3079F DIAST BP 80-89 MM HG: CPT | Mod: CPTII,,, | Performed by: FAMILY MEDICINE

## 2024-03-26 NOTE — TELEPHONE ENCOUNTER
Order placed for thyriod US per MD, and routed to [facility], pt made aware to call and schedule appt

## 2024-04-09 ENCOUNTER — HOSPITAL ENCOUNTER (OUTPATIENT)
Dept: RADIOLOGY | Facility: HOSPITAL | Age: 44
Discharge: HOME | End: 2024-04-09
Attending: FAMILY MEDICINE
Payer: MEDICAID

## 2024-04-09 DIAGNOSIS — R22.1 LOCALIZED SWELLING, MASS AND LUMP, NECK: ICD-10-CM

## 2024-04-09 PROCEDURE — 76536 US EXAM OF HEAD AND NECK: CPT | Mod: TC

## 2024-09-12 DIAGNOSIS — F32.A ANXIETY AND DEPRESSION: Primary | ICD-10-CM

## 2024-09-12 DIAGNOSIS — F41.9 ANXIETY AND DEPRESSION: Primary | ICD-10-CM

## 2024-09-12 DIAGNOSIS — J30.2 SEASONAL ALLERGIES: ICD-10-CM

## 2024-09-12 RX ORDER — LORATADINE 10 MG/1
10 TABLET ORAL
Qty: 90 TABLET | Refills: 1 | Status: SHIPPED | OUTPATIENT
Start: 2024-09-12

## 2024-09-12 RX ORDER — BUSPIRONE HYDROCHLORIDE 15 MG/1
TABLET ORAL
Qty: 90 TABLET | Refills: 1 | Status: SHIPPED | OUTPATIENT
Start: 2024-09-12

## 2024-12-26 ENCOUNTER — HOSPITAL ENCOUNTER (EMERGENCY)
Facility: HOSPITAL | Age: 44
Discharge: LEFT BEFORE DISCHARGE | End: 2024-12-27
Payer: COMMERCIAL

## 2024-12-26 VITALS
TEMPERATURE: 98 F | OXYGEN SATURATION: 100 % | HEART RATE: 85 BPM | RESPIRATION RATE: 18 BRPM | HEIGHT: 62 IN | WEIGHT: 180 LBS | SYSTOLIC BLOOD PRESSURE: 162 MMHG | DIASTOLIC BLOOD PRESSURE: 102 MMHG | BODY MASS INDEX: 33.13 KG/M2

## 2024-12-26 PROCEDURE — 99900041 HC LEFT WITHOUT BEING SEEN- EMERGENCY

## 2025-07-17 ENCOUNTER — PATIENT OUTREACH (OUTPATIENT)
Facility: CLINIC | Age: 45
End: 2025-07-17
Payer: COMMERCIAL

## 2025-08-20 ENCOUNTER — TELEPHONE (OUTPATIENT)
Dept: FAMILY MEDICINE | Facility: CLINIC | Age: 45
End: 2025-08-20
Payer: COMMERCIAL

## 2025-08-20 DIAGNOSIS — Z00.00 WELLNESS EXAMINATION: Primary | ICD-10-CM

## 2025-08-20 DIAGNOSIS — E78.2 MIXED HYPERLIPIDEMIA: ICD-10-CM

## 2025-08-20 DIAGNOSIS — I10 ESSENTIAL (PRIMARY) HYPERTENSION: ICD-10-CM

## (undated) DEVICE — ADHESIVE MASTISOL VIAL 48/BX

## (undated) DEVICE — SUT VICRYL PLUS 4-0 FS-2 27IN

## (undated) DEVICE — SCISSOR CURVED ENDOPATH 5MM

## (undated) DEVICE — SUT VICRYL+ 27 UR-6 VIOL

## (undated) DEVICE — GLOVE PROTEXIS BLUE LATEX 7.5

## (undated) DEVICE — CANNULA ENDOPATH XCEL 5X100MM

## (undated) DEVICE — CORD LAP 10 DISP

## (undated) DEVICE — BAG SPEC RETRV ENDO 4X6IN DISP

## (undated) DEVICE — TROCAR ENDOPATH XCEL 11MM 10CM

## (undated) DEVICE — GLOVE PROTEXIS HYDROGEL SZ7.5

## (undated) DEVICE — SOL IRRI STRL WATER 1000ML

## (undated) DEVICE — DRAPE SLUSH WARMER 66X44IN

## (undated) DEVICE — KIT GEN LAPAROSCOPY LAFAYETTE

## (undated) DEVICE — CLOSURE SKIN STERI STRIP 1/2X4

## (undated) DEVICE — IRRIGATOR SUCTION W/TIP

## (undated) DEVICE — KIT SURGICAL TURNOVER

## (undated) DEVICE — TROCAR ENDOPATH XCEL 5X100MM

## (undated) DEVICE — DISSECTOR 5MM ENDOPATH

## (undated) DEVICE — BLADE SURG STAINLESS STEEL #11

## (undated) DEVICE — APPLICATOR STRL COT 2INNR 6IN

## (undated) DEVICE — SYR 10CC LUER LOCK

## (undated) DEVICE — NDL INSUF ULTRA VERESS 120MM

## (undated) DEVICE — APPLIER CLIP ENDO MED/LG 10MM

## (undated) DEVICE — ELECTRODE PATIENT RETURN DISP

## (undated) DEVICE — NDL SAFETY 22G X 1.5 ECLIPSE